# Patient Record
Sex: MALE | Race: WHITE | NOT HISPANIC OR LATINO | Employment: UNEMPLOYED | URBAN - METROPOLITAN AREA
[De-identification: names, ages, dates, MRNs, and addresses within clinical notes are randomized per-mention and may not be internally consistent; named-entity substitution may affect disease eponyms.]

---

## 2018-05-11 ENCOUNTER — OFFICE VISIT (OUTPATIENT)
Dept: URGENT CARE | Facility: CLINIC | Age: 38
End: 2018-05-11
Payer: COMMERCIAL

## 2018-05-11 VITALS
DIASTOLIC BLOOD PRESSURE: 106 MMHG | WEIGHT: 200 LBS | OXYGEN SATURATION: 99 % | TEMPERATURE: 98 F | HEART RATE: 100 BPM | BODY MASS INDEX: 27.09 KG/M2 | RESPIRATION RATE: 16 BRPM | SYSTOLIC BLOOD PRESSURE: 160 MMHG | HEIGHT: 72 IN

## 2018-05-11 DIAGNOSIS — K29.20 ACUTE ALCOHOLIC GASTRITIS WITHOUT HEMORRHAGE: Primary | ICD-10-CM

## 2018-05-11 DIAGNOSIS — R11.2 NON-INTRACTABLE VOMITING WITH NAUSEA, UNSPECIFIED VOMITING TYPE: ICD-10-CM

## 2018-05-11 PROCEDURE — 96372 THER/PROPH/DIAG INJ SC/IM: CPT | Mod: S$GLB,,, | Performed by: EMERGENCY MEDICINE

## 2018-05-11 PROCEDURE — 99203 OFFICE O/P NEW LOW 30 MIN: CPT | Mod: 25,S$GLB,, | Performed by: NURSE PRACTITIONER

## 2018-05-11 RX ORDER — ONDANSETRON 4 MG/1
4 TABLET, FILM COATED ORAL DAILY PRN
Qty: 30 TABLET | Refills: 0 | Status: SHIPPED | OUTPATIENT
Start: 2018-05-11 | End: 2018-11-15

## 2018-05-11 RX ORDER — ONDANSETRON 2 MG/ML
4 INJECTION INTRAMUSCULAR; INTRAVENOUS
Status: COMPLETED | OUTPATIENT
Start: 2018-05-11 | End: 2018-05-11

## 2018-05-11 RX ADMIN — ONDANSETRON 4 MG: 2 INJECTION INTRAMUSCULAR; INTRAVENOUS at 02:05

## 2018-05-11 NOTE — PROGRESS NOTES
Subjective:       Patient ID: Darryl Becerra is a 37 y.o. male.    Vitals:  height is 6' (1.829 m) and weight is 90.7 kg (200 lb). His temperature is 98 °F (36.7 °C). His blood pressure is 160/106 (abnormal) and his pulse is 100. His respiration is 16 and oxygen saturation is 99%.     Chief Complaint: Abdominal Pain (x 1 day )    He has had epigastric stomach pain that started this morning. He hasent been able to keep anything down all day. No blood or mucusin emesis. One bout of loose stool. Endorses chills earlier in day. He feels like he drank ETOH in excess last night but denies this as a chronic issue. Denies eating anything unusual or that would have caused stomach issue (fried chicken).    Pt without Hx of HTN, but drank in excess last night and is very uncomfortable today likely reason for current eelvation. Advised him to monitor and report to PCP at next visit.      Abdominal Pain   This is a new problem. The current episode started today. The onset quality is sudden. The problem occurs constantly. The pain is located in the epigastric region. The pain is at a severity of 7/10. The pain is moderate. The quality of the pain is cramping. The abdominal pain radiates to the epigastric region. Associated symptoms include frequency, nausea and vomiting. Pertinent negatives include no constipation, diarrhea, dysuria, fever or hematuria. The pain is aggravated by movement, vomiting and eating. The pain is relieved by nothing. He has tried antacids for the symptoms. The treatment provided no relief. There is no history of abdominal surgery, GERD or irritable bowel syndrome. Patient's medical history includes kidney stones.     Review of Systems   Constitution: Positive for chills, malaise/fatigue and night sweats. Negative for fever.   Eyes: Negative for discharge.   Skin: Negative for flushing and rash.   Musculoskeletal: Negative for back pain.   Gastrointestinal: Positive for abdominal pain, nausea and  vomiting. Negative for constipation and diarrhea.   Genitourinary: Positive for frequency. Negative for dysuria, genital sores, hematuria and urgency.   Neurological: Positive for light-headedness.       Objective:      Physical Exam   Constitutional: He is oriented to person, place, and time. He appears well-developed and well-nourished.  Non-toxic appearance. He does not have a sickly appearance. He appears ill.   Appears uncomfortable   HENT:   Head: Normocephalic and atraumatic.   Right Ear: External ear normal.   Left Ear: External ear normal.   Nose: Nose normal.   Mouth/Throat: Mucous membranes are normal.   No sublingual icterus   Eyes: Conjunctivae and lids are normal. No scleral icterus.   Neck: Trachea normal and full passive range of motion without pain. Neck supple.   Cardiovascular: Normal rate, regular rhythm, normal heart sounds and normal pulses.    Pulmonary/Chest: Effort normal and breath sounds normal. No respiratory distress. He has no wheezes. He has no rhonchi. He has no rales.   Abdominal: Soft. Normal appearance and bowel sounds are normal. He exhibits no distension, no abdominal bruit, no pulsatile midline mass and no mass. There is no hepatosplenomegaly. There is generalized tenderness. There is no rigidity, no rebound, no guarding, no tenderness at McBurney's point and negative Murillo's sign.   Anticipatory tenderness while palpating abdomen (no specific area of tenderness)   Musculoskeletal: Normal range of motion. He exhibits no edema.   Neurological: He is alert and oriented to person, place, and time. He has normal strength.   Skin: Skin is warm, dry and intact. He is not diaphoretic. No pallor.   Psychiatric: He has a normal mood and affect. His speech is normal and behavior is normal. Judgment and thought content normal. Cognition and memory are normal.   Nursing note and vitals reviewed.      Total relief of nausea after IM Zofran.    Assessment:       1. Acute alcoholic gastritis  without hemorrhage    2. Non-intractable vomiting with nausea, unspecified vomiting type        Plan:         Acute alcoholic gastritis without hemorrhage  -     ondansetron (ZOFRAN) 4 MG tablet; Take 1 tablet (4 mg total) by mouth daily as needed.  Dispense: 30 tablet; Refill: 0    Non-intractable vomiting with nausea, unspecified vomiting type  -     ondansetron injection 4 mg; Inject 4 mg into the muscle one time.  -     ondansetron (ZOFRAN) 4 MG tablet; Take 1 tablet (4 mg total) by mouth daily as needed.  Dispense: 30 tablet; Refill: 0      Patient Instructions     Gastritis (Adult)    Gastritis is inflammation and irritation of the stomach lining. It can be present for a short time (acute) or be long lasting (chronic). Gastritis is often caused by infection with bacteria called H pylori. More than a third of people in the  have this bacteria in their bodies. In many cases, H pylori causes no problems or symptoms. In some people, though, the infection irritates the stomach lining and causes gastritis. Other causes of stomach irritation include drinking alcohol or taking pain-relieving medicines called NSAIDs (such as aspirin or ibuprofen).   Symptoms of gastritis can include:  · Abdominal pain or bloating  · Loss of appetite  · Nausea or vomiting  · Vomiting blood or having black stools  · Feeling more tired than usual  An inflamed and irritated stomach lining is more likely to develop a sore called an ulcer. To help prevent this, gastritis should be treated.  Home care  If needed, medicines may be prescribed. If you have H pylori infection, treating it will likely relieve your symptoms. Other changes can help reduce stomach irritation and help it heal.  · If you have been prescribed medicines for H pylori infection, take them as directed. Take all of the medicine until it is finished or your healthcare provider tells you to stop, even if you feel better.  · Your healthcare provider may recommend avoiding  NSAIDs. If you take daily aspirin for your heart or other medical reasons, do not stop without talking to your healthcare provider first.  · Avoid drinking alcohol.  · Stop smoking. Smoking can irritate the stomach and delay healing. As much as possible, stay away from second hand smoke.  Follow-up care  Follow up with your healthcare provider, or as advised by our staff. Testing may be needed to check for inflammation or an ulcer.  When to seek medical advice  Call your healthcare provider for any of the following:  · Stomach pain that gets worse or moves to the lower right abdomen (appendix area)  · Chest pain that appears or gets worse, or spreads to the back, neck, shoulder, or arm  · Frequent vomiting (cant keep down liquids)  · Blood in the stool or vomit (red or black in color)  · Feeling weak or dizzy  · Fever of 100.4ºF (38ºC) or higher, or as directed by your healthcare provider  Date Last Reviewed: 6/22/2015  © 0622-4207 [employer] [address] [place] [address], [place] [address]. All rights reserved. This information is not intended as a substitute for professional medical care. Always follow your healthcare professional's instructions.

## 2018-05-11 NOTE — LETTER
May 11, 2018      [facility] [address]  Phone: [phone]  Fax: [phone]       Patient: Darryl Becerra   YOB: 1980  Date of Visit: 05/11/2018    To Whom It May Concern:    Lula Becerra  was at [facility] on 05/11/2018. He may return to work/school on 5/14/2018 with no restrictions. If you have any questions or concerns, or if I can be of further assistance, please do not hesitate to contact me.    Sincerely,        Joey Encarnacion, NP

## 2018-05-11 NOTE — PATIENT INSTRUCTIONS
Gastritis (Adult)    Gastritis is inflammation and irritation of the stomach lining. It can be present for a short time (acute) or be long lasting (chronic). Gastritis is often caused by infection with bacteria called H pylori. More than a third of people in the [place] have this bacteria in their bodies. In many cases, H pylori causes no problems or symptoms. In some people, though, the infection irritates the stomach lining and causes gastritis. Other causes of stomach irritation include drinking alcohol or taking pain-relieving medicines called NSAIDs (such as aspirin or ibuprofen).   Symptoms of gastritis can include:  · Abdominal pain or bloating  · Loss of appetite  · Nausea or vomiting  · Vomiting blood or having black stools  · Feeling more tired than usual  An inflamed and irritated stomach lining is more likely to develop a sore called an ulcer. To help prevent this, gastritis should be treated.  Home care  If needed, medicines may be prescribed. If you have H pylori infection, treating it will likely relieve your symptoms. Other changes can help reduce stomach irritation and help it heal.  · If you have been prescribed medicines for H pylori infection, take them as directed. Take all of the medicine until it is finished or your healthcare provider tells you to stop, even if you feel better.  · Your healthcare provider may recommend avoiding NSAIDs. If you take daily aspirin for your heart or other medical reasons, do not stop without talking to your healthcare provider first.  · Avoid drinking alcohol.  · Stop smoking. Smoking can irritate the stomach and delay healing. As much as possible, stay away from second hand smoke.  Follow-up care  Follow up with your healthcare provider, or as advised by our staff. Testing may be needed to check for inflammation or an ulcer.  When to seek medical advice  Call your healthcare provider for any of the following:  · Stomach pain that gets worse or moves to the lower  right abdomen (appendix area)  · Chest pain that appears or gets worse, or spreads to the back, neck, shoulder, or arm  · Frequent vomiting (cant keep down liquids)  · Blood in the stool or vomit (red or black in color)  · Feeling weak or dizzy  · Fever of 100.4ºF (38ºC) or higher, or as directed by your healthcare provider  Date Last Reviewed: 6/22/2015  © 8170-7731 [employer]. [address] [place] [address]. All rights reserved. This information is not intended as a substitute for professional medical care. Always follow your healthcare professional's instructions.

## 2018-09-19 ENCOUNTER — OFFICE VISIT (OUTPATIENT)
Dept: URGENT CARE | Facility: CLINIC | Age: 38
End: 2018-09-19
Payer: COMMERCIAL

## 2018-09-19 VITALS
DIASTOLIC BLOOD PRESSURE: 96 MMHG | SYSTOLIC BLOOD PRESSURE: 144 MMHG | WEIGHT: 200 LBS | HEIGHT: 72 IN | OXYGEN SATURATION: 98 % | BODY MASS INDEX: 27.09 KG/M2 | TEMPERATURE: 97 F | RESPIRATION RATE: 16 BRPM | HEART RATE: 85 BPM

## 2018-09-19 DIAGNOSIS — M10.9 ACUTE GOUT OF LEFT HAND, UNSPECIFIED CAUSE: Primary | ICD-10-CM

## 2018-09-19 DIAGNOSIS — Z76.89 ESTABLISHING CARE WITH NEW DOCTOR, ENCOUNTER FOR: ICD-10-CM

## 2018-09-19 PROCEDURE — 99214 OFFICE O/P EST MOD 30 MIN: CPT | Mod: S$GLB,,, | Performed by: NURSE PRACTITIONER

## 2018-09-19 RX ORDER — METHYLPREDNISOLONE 4 MG/1
TABLET ORAL
Qty: 1 PACKAGE | Refills: 0 | Status: SHIPPED | OUTPATIENT
Start: 2018-09-19 | End: 2021-03-04 | Stop reason: ALTCHOICE

## 2018-09-19 RX ORDER — NAPROXEN SODIUM 220 MG
220 TABLET ORAL
COMMUNITY

## 2018-09-19 RX ORDER — INDOMETHACIN 50 MG/1
50 CAPSULE ORAL 3 TIMES DAILY
Qty: 21 CAPSULE | Refills: 0 | Status: SHIPPED | OUTPATIENT
Start: 2018-09-19 | End: 2018-09-26

## 2018-09-19 NOTE — PROGRESS NOTES
Subjective:       Patient ID: Darryl Becerra is a 37 y.o. male.    Vitals:  height is 6' (1.829 m) and weight is 90.7 kg (200 lb). His temperature is 97.2 °F (36.2 °C). His blood pressure is 144/96 (abnormal) and his pulse is 85. His respiration is 16 and oxygen saturation is 98%.     Chief Complaint: Hand Pain    Patient in for left knuckle pain that started 2 days ago. Its the 3rd and fourth carpels that's are affected. Has been taking aleve for the pain. Patient has history of gout, but it was on his foot and last flare up was on his foot. Patient is from [place], but has lived here for 10 years. Patient is a . Denies any injuries or trauma to the hand. Knuckles are red and swollen. Patient has pain with moving his hand and fingers.       Hand Pain    The incident occurred 2 days ago. There was no injury mechanism. The pain radiates to the left hand. The pain is at a severity of 9/10. Associated symptoms include numbness and tingling. Pertinent negatives include no chest pain. He has tried NSAIDs for the symptoms.     Review of Systems   Constitution: Negative for chills and fever.   HENT: Negative for sore throat.    Eyes: Negative for blurred vision.   Cardiovascular: Negative for chest pain.   Respiratory: Negative for shortness of breath.    Skin: Negative for rash.   Musculoskeletal: Positive for joint pain and joint swelling. Negative for back pain.   Gastrointestinal: Negative for abdominal pain, diarrhea, nausea and vomiting.   Neurological: Positive for numbness and tingling. Negative for headaches.   Psychiatric/Behavioral: The patient is not nervous/anxious.        Objective:      Physical Exam   Constitutional: He is oriented to person, place, and time. Vital signs are normal. He appears well-developed and well-nourished. He is active and cooperative. No distress.   HENT:   Head: Normocephalic and atraumatic.   Nose: Nose normal.   Mouth/Throat: Oropharynx is clear and moist and mucous  membranes are normal.   Eyes: Conjunctivae and lids are normal.   Neck: Trachea normal, normal range of motion, full passive range of motion without pain and phonation normal. Neck supple.   Cardiovascular: Normal rate, regular rhythm, normal heart sounds, intact distal pulses and normal pulses.   Pulses:       Radial pulses are 2+ on the right side, and 2+ on the left side.   Pulmonary/Chest: Effort normal and breath sounds normal.   Abdominal: Soft. Normal appearance and bowel sounds are normal. He exhibits no abdominal bruit, no pulsatile midline mass and no mass.   Musculoskeletal: He exhibits no edema or deformity.        Left hand: He exhibits decreased range of motion, tenderness (3rd and 4th knuckle swelling and erythema. TTP. mildly warm to touch. no abrasions or open wounds present. ), bony tenderness and swelling. He exhibits normal two-point discrimination, normal capillary refill, no deformity and no laceration. Decreased sensation noted. Decreased sensation is not present in the ulnar distribution, is not present in the medial redistribution and is not present in the radial distribution. Normal strength noted. He exhibits no finger abduction, no thumb/finger opposition and no wrist extension trouble.        Hands:  Neurological: He is alert and oriented to person, place, and time. He has normal strength and normal reflexes. No sensory deficit.   Skin: Skin is warm, dry and intact. He is not diaphoretic.   Psychiatric: He has a normal mood and affect. His speech is normal and behavior is normal. Judgment and thought content normal. Cognition and memory are normal.   Nursing note and vitals reviewed.      Assessment:       1. Acute gout of left hand, unspecified cause    2. Establishing care with new doctor, encounter for        Plan:       MDM:  Patient previously has had gout has had flare for the last few years.  He is taking colchicine and Indocin in the past with relief of symptoms.  He does not  have a primary care doctor.  Given a referral to establish care with a new PCP.  He is educated on gout treatment and avoidance of certain foods.  He verbalizes understanding and is in agreement with plan of care.    Acute gout of left hand, unspecified cause  -     indomethacin (INDOCIN) 50 MG capsule; Take 1 capsule (50 mg total) by mouth 3 (three) times daily. for 7 days  Dispense: 21 capsule; Refill: 0  -     methylPREDNISolone (MEDROL DOSEPACK) 4 mg tablet; use as directed  Dispense: 1 Package; Refill: 0    Establishing care with new doctor, encounter for  -     Ambulatory referral to Internal Medicine      Patient Instructions       Gout    Gout is an inflammation of a joint due to a build-up of gout crystals in the joint fluid. This occurs when there is an excess of uric acid (a normal waste product) in the body. Uric acid builds up in the body when the kidneys are unable to filter enough of it from the blood. This may occur with age. It is also associated with kidney disease. Gout occurs more often in people with obesity, diabetes, high blood pressure, or high levels of fats in the blood. It may run in families. Gout tends to come and go. A flare up of gout is called an attack. Drinking alcohol or eating certain foods (such as shellfish or foods with additives such as high-fructose corn syrup) may increase uric acid levels in the blood and cause a gout attack.  During a gout attack, the affected joint may become a hot, red, swollen and painful. If you have had one attack of gout, you are likely to have another. An attack of gout can be treated with medicine. If these attacks become frequent, a daily medicine may be prescribed to help the kidneys remove uric acid from the body.  Home care  During a gout attack:  · Rest painful joints. If gout affects the joints of your foot or leg, you may want to use crutches for the first few days to keep from bearing weight on the affected joint.  · When sitting or lying  down, raise the painful joint to a level higher than your heart.  · Apply an ice pack (ice cubes in a plastic bag wrapped in a thin towel) over the injured area for 20 minutes every 1 to 2 hours the first day for pain relief. Continue this 3 to 4 times a day for swelling and pain.  · Avoid alcohol and foods listed below (see Preventing attacks) during a gout attack. Drink extra fluid to help flush the uric acid through your kidneys.  · If you were prescribed a medicine to treat gout, take it as your healthcare provider has instructed. Don't skip doses.  · Take anti-inflammatory medicine as directed.   · If pain medicines have been prescribed, take them exactly as directed.    Preventing attacks  · Minimize or avoid alcohol use. Excess alcohol intake can cause a gout attack.  · Limit these foods and beverages:  ¨ Organ meats, such as kidneys and liver  ¨ Certain seafoods (anchovies, sardines, shrimp, scallops, herring, mackerel)  ¨ Wild game, meat extracts and meat gravies  ¨ Foods and beverages sweetened with high-fructose corn syrup, such as sodas  · Eat a healthy diet including low-fat and nonfat dairy, whole grains, and vegetables.  · If you are overweight, talk to your healthcare provider about a weight reduction plan. Avoid fasting or extreme low calorie diets (less than 900 calories per day). This will increase uric acid levels in the body.  · If you have diabetes or high blood pressure, work with your doctor to manage these conditions.  · Protect the joint from injury. Trauma can trigger a gout attack.  Follow-up care  Follow up with your healthcare provider, or as advised.   When to seek medical advice  Call your healthcare provider if you have any of the following:  · Fever over 100.4°F (38.ºC) with worsening joint pain  · Increasing redness around the joint  · Pain developing in another joint  · Repeated vomiting, abdominal pain, or blood in the vomit or stool (black or red color)  Date Last Reviewed:  3/1/2017  © 6617-9166 [employer]. [address] [place] [address], [place] [address]. All rights reserved. This information is not intended as a substitute for professional medical care. Always follow your healthcare professional's instructions.        Treating Gout Attacks     Raising the joint above the level of your heart can help reduce gout symptoms.     Gout is a disease that affects the joints. It is caused by excess uric acid in your blood stream that may lead to crystals forming in your joints. Left untreated, it can lead to painful foot and joint deformities and even kidney problems. But, by treating gout early, you can relieve pain and help prevent future problems. Gout can usually be treated with medication and proper diet. In severe cases, surgery may be needed.  Gout attacks are painful and often happen more than once. Taking medications may reduce pain and prevent attacks in the future. There are also some things you can do at home to relieve symptoms.  Medications for gout  Your healthcare provider may prescribe a daily medication to reduce levels of uric acid. Reducing your uric acid levels may help prevent gout attacks. Allopurinol is one commonly used medication taken daily to reduce uric acid levels. Other medications can help relieve pain and swelling during an acute attack. Medicines such as NSAIDs (nonsteroidal anti-inflammatory medicines), steroids, and colchicine may be prescribed for intermittent use to relieve an acute gout attack. Be sure to take your medication as directed.  What you can do  Below are some things you can do at home to relieve gout symptoms. Your healthcare provider may have other tips.  · Rest the painful joint as much as you can.  · Raise the painful joint so it is at a level higher than your heart.  · Use ice for 10 minutes every 1-2 hours as possible.  How can I prevent gout?  With a little effort, you may be able to prevent gout attacks in the future. Here are  some things you can do:  · Avoid foods high in purines  ¨ Certain meats (red meat, processed meat, turkey)  ¨ Organ meats (kidney, liver, sweetbread)  ¨ Shellfish (lobster, crab, shrimp, scallop, mussel)  ¨ Certain fish (anchovy, sardine, herring, mackerel)  · Take any medications prescribed by your healthcare provider.  · Lose weight if you need to.  · Reduce high fructose corn syrup in meals and drinks.  · Reduce or eliminate consumption of alcohol, particularly beer, but also red wine and spirits.  · Control blood pressure, diabetes, and cholesterol.  · Drink plenty of water to help flush uric acid from your body.  Date Last Reviewed: 2/1/2016 © 2000-2017 [employer]. [address] [place] [address], [place] [address]. All rights reserved. This information is not intended as a substitute for professional medical care. Always follow your healthcare professional's instructions.        Gout Diet  Gout is a painful condition caused by an excess of uric acid, a waste product made by the body. Uric acid forms crystals that collect in the joints. The immune response to these crystals brings on symptoms of joint pain and swelling. This is called a gout attack. Often, medications and diet changes are combined to manage gout. Below are some guidelines for changing your diet to help you manage gout and prevent attacks. Your health care provider will help you determine the best eating plan for you.     Eating to manage gout  Weight loss for those who are overweight may help reduce gout attacks.  Eat less of these foods  Eating too many foods containing purines may raise the levels of uric acid in your body. This raises your risk for a gout attack. Try to limit these foods and drinks:  · Alcohol, such as beer and red wine. You may be told to avoid alcohol completely.  · Soft drinks that contain sugar or high fructose corn syrup  · Certain fish, including anchovies, sardines, fish eggs, and  herring  · Shellfish  · Certain meats, such as red meat, hot dogs, luncheon meats, and turkey  · Organ meats, such as liver, kidneys, and sweetbreads  · Legumes, such as dried beans and peas  · Other high fat foods such as gravy, whole milk, and high fat cheeses  · Vegetables such as asparagus, cauliflower, spinach, and mushrooms used to be thought to contribute to an increased risk for a gout attack, but recent studies show that high purine vegetables don't increase the risk for a gout attack.  Eat more of these foods  Other foods may be helpful for people with gout. Add some of these foods to your diet:  · Cherries contain chemicals that may lower uric acid.  · Omega fatty acids. These are found in some fatty fish such as salmon, certain oils (flax, olive, or nut), and nuts themselves. Omega fatty acids may help prevent inflammation due to gout.  · Dairy products that are low-fat or fat-free, such as cheese and yogurt  · Complex carbohydrate foods, including whole grains, brown rice, oats, and beans  · Coffee, in moderation  · Water, approximately 64 ounces per day  Follow-up care  Follow up with your healthcare provider as advised.  When to seek medical advice  Call your healthcare provider right away if any of these occur:  · Return of gout symptoms, usually at night:  · Severe pain, swelling, and heat in a joint, especially the base of the big toe  · Affected joint is hard to move  · Skin of the affected joint is purple or red  · Fever of 100.4°F (38°C) or higher  · Pain that doesn't get better even with prescribed medicine   Date Last Reviewed: 1/12/2016  © 7869-9314 [employer]. [address] [place] [address]. All rights reserved. This information is not intended as a substitute for professional medical care. Always follow your healthcare professional's instructions.      -Indocin 3 times daily for the next 7 days.  -Medrol(steroid) dose pack.  -Referral to establish care with a  primary care doctor.  -If symptoms worsen go to the ER.  -Follow up and establish care with a new primary care doctor.      Please follow up with your Primary care provider within 2-5 days if your signs and symptoms have not resolved or worsen.     If your condition worsens or fails to improve we recommend that you receive another evaluation at the emergency room immediately or contact your primary medical clinic to discuss your concerns.   You must understand that you have received an Urgent Care treatment only and that you may be released before all of your medical problems are known or treated. You, the patient, will arrange for follow up care as instructed.

## 2018-09-19 NOTE — PATIENT INSTRUCTIONS
Gout    Gout is an inflammation of a joint due to a build-up of gout crystals in the joint fluid. This occurs when there is an excess of uric acid (a normal waste product) in the body. Uric acid builds up in the body when the kidneys are unable to filter enough of it from the blood. This may occur with age. It is also associated with kidney disease. Gout occurs more often in people with obesity, diabetes, high blood pressure, or high levels of fats in the blood. It may run in families. Gout tends to come and go. A flare up of gout is called an attack. Drinking alcohol or eating certain foods (such as shellfish or foods with additives such as high-fructose corn syrup) may increase uric acid levels in the blood and cause a gout attack.  During a gout attack, the affected joint may become a hot, red, swollen and painful. If you have had one attack of gout, you are likely to have another. An attack of gout can be treated with medicine. If these attacks become frequent, a daily medicine may be prescribed to help the kidneys remove uric acid from the body.  Home care  During a gout attack:  · Rest painful joints. If gout affects the joints of your foot or leg, you may want to use crutches for the first few days to keep from bearing weight on the affected joint.  · When sitting or lying down, raise the painful joint to a level higher than your heart.  · Apply an ice pack (ice cubes in a plastic bag wrapped in a thin towel) over the injured area for 20 minutes every 1 to 2 hours the first day for pain relief. Continue this 3 to 4 times a day for swelling and pain.  · Avoid alcohol and foods listed below (see Preventing attacks) during a gout attack. Drink extra fluid to help flush the uric acid through your kidneys.  · If you were prescribed a medicine to treat gout, take it as your healthcare provider has instructed. Don't skip doses.  · Take anti-inflammatory medicine as directed.   · If pain medicines have been  prescribed, take them exactly as directed.    Preventing attacks  · Minimize or avoid alcohol use. Excess alcohol intake can cause a gout attack.  · Limit these foods and beverages:  ¨ Organ meats, such as kidneys and liver  ¨ Certain seafoods (anchovies, sardines, shrimp, scallops, herring, mackerel)  ¨ Wild game, meat extracts and meat gravies  ¨ Foods and beverages sweetened with high-fructose corn syrup, such as sodas  · Eat a healthy diet including low-fat and nonfat dairy, whole grains, and vegetables.  · If you are overweight, talk to your healthcare provider about a weight reduction plan. Avoid fasting or extreme low calorie diets (less than 900 calories per day). This will increase uric acid levels in the body.  · If you have diabetes or high blood pressure, work with your doctor to manage these conditions.  · Protect the joint from injury. Trauma can trigger a gout attack.  Follow-up care  Follow up with your healthcare provider, or as advised.   When to seek medical advice  Call your healthcare provider if you have any of the following:  · Fever over 100.4°F (38.ºC) with worsening joint pain  · Increasing redness around the joint  · Pain developing in another joint  · Repeated vomiting, abdominal pain, or blood in the vomit or stool (black or red color)  Date Last Reviewed: 3/1/2017  © 1815-9239 [employer]. [address] [place] [address], [place] [address]. All rights reserved. This information is not intended as a substitute for professional medical care. Always follow your healthcare professional's instructions.        Treating Gout Attacks     Raising the joint above the level of your heart can help reduce gout symptoms.     Gout is a disease that affects the joints. It is caused by excess uric acid in your blood stream that may lead to crystals forming in your joints. Left untreated, it can lead to painful foot and joint deformities and even kidney problems. But, by treating gout early, you can  relieve pain and help prevent future problems. Gout can usually be treated with medication and proper diet. In severe cases, surgery may be needed.  Gout attacks are painful and often happen more than once. Taking medications may reduce pain and prevent attacks in the future. There are also some things you can do at home to relieve symptoms.  Medications for gout  Your healthcare provider may prescribe a daily medication to reduce levels of uric acid. Reducing your uric acid levels may help prevent gout attacks. Allopurinol is one commonly used medication taken daily to reduce uric acid levels. Other medications can help relieve pain and swelling during an acute attack. Medicines such as NSAIDs (nonsteroidal anti-inflammatory medicines), steroids, and colchicine may be prescribed for intermittent use to relieve an acute gout attack. Be sure to take your medication as directed.  What you can do  Below are some things you can do at home to relieve gout symptoms. Your healthcare provider may have other tips.  · Rest the painful joint as much as you can.  · Raise the painful joint so it is at a level higher than your heart.  · Use ice for 10 minutes every 1-2 hours as possible.  How can I prevent gout?  With a little effort, you may be able to prevent gout attacks in the future. Here are some things you can do:  · Avoid foods high in purines  ¨ Certain meats (red meat, processed meat, turkey)  ¨ Organ meats (kidney, liver, sweetbread)  ¨ Shellfish (lobster, crab, shrimp, scallop, mussel)  ¨ Certain fish (anchovy, sardine, herring, mackerel)  · Take any medications prescribed by your healthcare provider.  · Lose weight if you need to.  · Reduce high fructose corn syrup in meals and drinks.  · Reduce or eliminate consumption of alcohol, particularly beer, but also red wine and spirits.  · Control blood pressure, diabetes, and cholesterol.  · Drink plenty of water to help flush uric acid from your body.  Date Last  Reviewed: 2/1/2016  © 6447-4144 [employer]. [address] [place] [address], [place] [address]. All rights reserved. This information is not intended as a substitute for professional medical care. Always follow your healthcare professional's instructions.        Gout Diet  Gout is a painful condition caused by an excess of uric acid, a waste product made by the body. Uric acid forms crystals that collect in the joints. The immune response to these crystals brings on symptoms of joint pain and swelling. This is called a gout attack. Often, medications and diet changes are combined to manage gout. Below are some guidelines for changing your diet to help you manage gout and prevent attacks. Your health care provider will help you determine the best eating plan for you.     Eating to manage gout  Weight loss for those who are overweight may help reduce gout attacks.  Eat less of these foods  Eating too many foods containing purines may raise the levels of uric acid in your body. This raises your risk for a gout attack. Try to limit these foods and drinks:  · Alcohol, such as beer and red wine. You may be told to avoid alcohol completely.  · Soft drinks that contain sugar or high fructose corn syrup  · Certain fish, including anchovies, sardines, fish eggs, and herring  · Shellfish  · Certain meats, such as red meat, hot dogs, luncheon meats, and turkey  · Organ meats, such as liver, kidneys, and sweetbreads  · Legumes, such as dried beans and peas  · Other high fat foods such as gravy, whole milk, and high fat cheeses  · Vegetables such as asparagus, cauliflower, spinach, and mushrooms used to be thought to contribute to an increased risk for a gout attack, but recent studies show that high purine vegetables don't increase the risk for a gout attack.  Eat more of these foods  Other foods may be helpful for people with gout. Add some of these foods to your diet:  · Cherries contain chemicals that may lower uric  acid.  · Omega fatty acids. These are found in some fatty fish such as salmon, certain oils (flax, olive, or nut), and nuts themselves. Omega fatty acids may help prevent inflammation due to gout.  · Dairy products that are low-fat or fat-free, such as cheese and yogurt  · Complex carbohydrate foods, including whole grains, brown rice, oats, and beans  · Coffee, in moderation  · Water, approximately 64 ounces per day  Follow-up care  Follow up with your healthcare provider as advised.  When to seek medical advice  Call your healthcare provider right away if any of these occur:  · Return of gout symptoms, usually at night:  · Severe pain, swelling, and heat in a joint, especially the base of the big toe  · Affected joint is hard to move  · Skin of the affected joint is purple or red  · Fever of 100.4°F (38°C) or higher  · Pain that doesn't get better even with prescribed medicine   Date Last Reviewed: 1/12/2016  © 8742-5724 [employer]. [address] [place] [address]. All rights reserved. This information is not intended as a substitute for professional medical care. Always follow your healthcare professional's instructions.      -Indocin 3 times daily for the next 7 days.  -Medrol(steroid) dose pack.  -Referral to establish care with a primary care doctor.  -If symptoms worsen go to the ER.  -Follow up and establish care with a new primary care doctor.      Please follow up with your Primary care provider within 2-5 days if your signs and symptoms have not resolved or worsen.     If your condition worsens or fails to improve we recommend that you receive another evaluation at the emergency room immediately or contact your primary medical clinic to discuss your concerns.   You must understand that you have received an Urgent Care treatment only and that you may be released before all of your medical problems are known or treated. You, the patient, will arrange for follow up care as  instructed.

## 2018-09-19 NOTE — LETTER
September 19, 2018      [facility] [address]  Phone: [phone]  Fax: [phone]       Patient: Darryl Becerra   YOB: 1980  Date of Visit: 09/19/2018    To Whom It May Concern:    Lula Becerra  was at [facility] on 09/19/2018. He may return to work/school on 09/21/18 with no restrictions. If you have any questions or concerns, or if I can be of further assistance, please do not hesitate to contact me.    Sincerely,        May Cunha NP

## 2018-09-22 ENCOUNTER — TELEPHONE (OUTPATIENT)
Dept: URGENT CARE | Facility: CLINIC | Age: 38
End: 2018-09-22

## 2018-11-15 ENCOUNTER — OFFICE VISIT (OUTPATIENT)
Dept: URGENT CARE | Facility: CLINIC | Age: 38
End: 2018-11-15
Payer: COMMERCIAL

## 2018-11-15 VITALS
SYSTOLIC BLOOD PRESSURE: 167 MMHG | HEIGHT: 72 IN | OXYGEN SATURATION: 99 % | WEIGHT: 200 LBS | BODY MASS INDEX: 27.09 KG/M2 | RESPIRATION RATE: 20 BRPM | TEMPERATURE: 97 F | HEART RATE: 113 BPM | DIASTOLIC BLOOD PRESSURE: 110 MMHG

## 2018-11-15 DIAGNOSIS — I10 ESSENTIAL HYPERTENSION: ICD-10-CM

## 2018-11-15 DIAGNOSIS — F17.200 SMOKING ADDICTION: ICD-10-CM

## 2018-11-15 DIAGNOSIS — F10.10 ALCOHOL ABUSE: Primary | ICD-10-CM

## 2018-11-15 DIAGNOSIS — Z87.898 HISTORY OF SEIZURE: ICD-10-CM

## 2018-11-15 PROCEDURE — 99214 OFFICE O/P EST MOD 30 MIN: CPT | Mod: S$GLB,,, | Performed by: NURSE PRACTITIONER

## 2018-11-15 PROCEDURE — 3008F BODY MASS INDEX DOCD: CPT | Mod: CPTII,S$GLB,, | Performed by: NURSE PRACTITIONER

## 2018-11-15 RX ORDER — LISINOPRIL 10 MG/1
10 TABLET ORAL DAILY
COMMUNITY
End: 2021-06-09 | Stop reason: ALTCHOICE

## 2018-11-15 NOTE — PATIENT INSTRUCTIONS
"  Continue to take your Lisinopril daily as prescribed.     Keep your up coming follow up with your Primary Provider.    If you have any of the Red Flag symptoms as we discussed (as listed below), please go immediately to the Emergency Department.    Alcohol Abuse  Alcoholic drinks are harmful when you have too many of them. There is no set number of drinks that defines too much. Drinking that disrupts your life or your health is called alcohol abuse. Alcohol abuse can hurt your relationships with others. You may lose friends, a spouse, or even your job. You may be abusing alcohol if any of the following are true for you:  · Duties at home or with  suffer because of drinking.  · Duties at work or in school suffer because of drinking.  · You have missed work or school because of drinking.  · You use alcohol while driving or operating machinery.  · You have legal problems such as arrests due to drinking.  · You keep drinking even though it causes serious problems in your life.  Health effects  Alcohol abuse causes health problems. Sometimes this can happen after only drinking a little." There is no set number of drinks or amount of alcohol that defines too much. The more you drink at one time, and the more often you drink determine both the short-term and long-term health effects. It affects all parts of your body and your health, including your:  · Brain. Alcohol is a central nervous system depressant. It can damage parts of the brain that affect your balance, memory, thinking, and emotions. It can cause memory loss, blackouts, depression, agitation, sleep cycle changes, and seizures. These changes may or may not be reversible.  · Heart and vascular system. Alcohol affects multiple areas. It can damage heart muscle causing cardiomyopathy, which is a weakening and stretching of the heart muscle. This can lead to trouble breathing, an irregular heartbeat, atrial fibrillation, leg swelling, and heart failure. " Alcohol use makes the blood vessels stiffen causing high blood pressure. All of these problems increase your risk of having heart attacks or strokes.  · Liver. Alcohol causes fat to build up in the liver, affecting its normal function. This increases the risk for hepatitis, leading to abdominal pain, appetite loss, jaundice, bleeding problems, liver fibrosis, and cirrhosis. This, in turn, can affect your ability to fight off infections, and can cause diabetes. The liver changes prevent it from removing toxins in your blood that can cause encephalopathy, which may show with confusion, altered level of consciousness, personality changes, memory loss, seizures, coma, and death.  · Pancreas. Alcohol can cause inflammation of the pancreas, or pancreatitis. This can cause abdominal pain, fever, and diabetes.  · Immune system. Alcohol weakens your immune system in a number of ways. It suppresses your immune system making it harder to fight infections and colds. It also increases the chance of getting pneumonia and tuberculosis.  · Cancer. Alcohol is a risk factor for developing cancer of the mouth, esophagus, pharynx, larynx, liver, and breast.  · Sexual function. Alcohol can lead to sexual problems.  Home care  The following guidelines will help you deal with alcohol abuse:  · Admit you have a problem with alcohol.  · Ask for help from your healthcare provider and trusted family members or close friends.  · Get help from people trained in dealing with alcohol abuse. This may be individual counseling or group therapy, or it may be a supervised alcohol treatment program.  · Join a self-help group for alcohol abuse such as Alcoholics Anonymous (AA).  · Avoid people who abuse alcohol or tempt you to drink.  Follow-up care  Follow up as advised by the healthcare provider, or as advised. Contact these groups to get help:  · Alcoholics Anonymous (AA): Go to www.aa.org or check the phone book for meetings near you.  · National  Alcohol and Substance Abuse Information Center ([facility]): 659-322-1309 www.addictionHelpMeNow.Glazeon  · National [place] on Alcoholism and Drug Dependence (NCADD): 052-UAF-UIWI (033-8213) www.ncadd.org  Call 911  Call 911 if any of these occur:  · Trouble breathing or slow irregular breathing  · Chest pain  · Sudden weakness on one side of your body or sudden trouble speaking  · Heavy bleeding or vomiting blood  · Very drowsy or trouble awakening  · Fainting or loss of consciousness  · Rapid heart rate  · Seizure  When to seek medical care  Call your healthcare provider right away if any of these occur:   · Confusion  · Hallucinations (seeing, hearing, or feeling things that arent there)  · Pain in your upper abdomen that gets worse  · Repeated vomiting or black or tarry stools  · Severe shakiness  Date Last Reviewed: 6/1/2016  © 4381-1569 [employer]. [address] [place] [address]. All rights reserved. This information is not intended as a substitute for professional medical care. Always follow your healthcare professional's instructions.      Established High Blood Pressure    High blood pressure (hypertension) is a chronic disease. Often, healthcare providers dont know what causes it. But it can be caused by certain health conditions and medicines.  If you have high blood pressure, you may not have any symptoms. If you do have symptoms, they may include headache, dizziness, changes in your vision, chest pain, and shortness of breath. But even without symptoms, high blood pressure thats not treated raises your risk for heart attack and stroke. High blood pressure is a serious health risk and shouldnt be ignored.  A blood pressure reading is made up of two numbers: a higher number over a lower number. The top number is the systolic pressure. The bottom number is the diastolic pressure. A normal blood pressure is a systolic pressure of  less than 120 over a diastolic pressure of less than  80. You will see your blood pressure readings written together. For example, a person with a systolic pressure of 188 and a diastolic pressure of 78 will have 118/78 written in the medical record.  High blood pressure is when either the top number is 140 or higher, or the bottom number is 90 or higher. This must be the result when taking your blood pressure a number of times. The blood pressures between normal and high are called prehypertension.  Home care  If you have high blood pressure, you should do what is listed below to lower your blood pressure. If you are taking medicines for high blood pressure, these methods may reduce or end your need for medicines in the future.  · Begin a weight-loss program if you are overweight.  · Cut back on how much salt you get in your diet. Heres how to do this:  ¨ Dont eat foods that have a lot of salt. These include olives, pickles, smoked meats, and salted potato chips.  ¨ Dont add salt to your food at the table.  ¨ Use only small amounts of salt when cooking.  · Start an exercise program. Talk with your healthcare provider about the type of exercise program that would be best for you. It doesn't have to be hard. Even brisk walking for 20 minutes 3 times a week is a good form of exercise.  · Dont take medicines that stimulate the heart. This includes many over-the-counter cold and sinus decongestant pills and sprays, as well as diet pills. Check the warnings about hypertension on the label. Before buying any over-the-counter medicines or supplements, always ask the pharmacist about the product's potential interaction with your high blood pressure and your high blood pressure medicines.  · Stimulants such as amphetamine or cocaine could be deadly for someone with high blood pressure. Never take these.  · Limit how much caffeine you get in your diet. Switch to caffeine-free products.  · Stop smoking. If you are a long-time smoker, this can be hard. Talk to your healthcare  provider about medicines and nicotine replacement options to help you. Also, enroll in a stop-smoking program to make it more likely that you will quit for good.  · Learn how to handle stress. This is an important part of any program to lower blood pressure. Learn about relaxation methods like meditation, yoga, or biofeedback.  · If your provider prescribed medicines, take them exactly as directed. Missing doses may cause your blood pressure get out of control.  · If you miss a dose or doses, check with your healthcare provider or pharmacist about what to do.  · Consider buying an automatic blood pressure machine. Ask your provider for a recommendation. You can get one of these at most pharmacies.     The American Heart Association recommends the following guidelines for home blood pressure monitoring:  · Don't smoke or drink coffee for 30 minutes before taking your blood pressure.  · Go to the bathroom before the test.  · Relax for 5 minutes before taking the measurement.  · Sit with your back supported (don't sit on a couch or soft chair); keep your feet on the floor uncrossed. Place your arm on a solid flat surface (like a table) with the upper part of the arm at heart level. Place the middle of the cuff directly above the eye of the elbow. Check the monitor's instruction manual for an illustration.  · Take multiple readings. When you measure, take 2 to 3 readings one minute apart and record all of the results.  · Take your blood pressure at the same time every day, or as your healthcare provider recommends.  · Record the date, time, and blood pressure reading.  · Take the record with you to your next medical appointment. If your blood pressure monitor has a built-in memory, simply take the monitor with you to your next appointment.  · Call your provider if you have several high readings. Don't be frightened by a single high blood pressure reading, but if you get several high readings, check in with your  healthcare provider.  · Note: When blood pressure reaches a systolic (top number) of 180 or higher OR diastolic (bottom number) of 110 or higher, seek emergency medical treatment.  Follow-up care  You will need to see your healthcare provider regularly. This is to check your blood pressure and to make changes to your medicines. Make a follow-up appointment as directed. Bring the record of your home blood pressure readings to the appointment.  When to seek medical advice  Call your healthcare provider right away if any of these occur:  · Blood pressure reaches a systolic (upper number) of 180 or higher OR a diastolic (bottom number) of 110 or higher  · Chest pain or shortness of breath  · Severe headache  · Throbbing or rushing sound in the ears  · Nosebleed  · Sudden severe pain in your belly (abdomen)  · Extreme drowsiness, confusion, or fainting  · Dizziness or spinning sensation (vertigo)  · Weakness of an arm or leg or one side of the face  · You have problems speaking or seeing   Date Last Reviewed: 12/1/2016  © 0484-5680 [employer]. [address] [place] [address], [place] [address]. All rights reserved. This information is not intended as a substitute for professional medical care. Always follow your healthcare professional's instructions.

## 2018-11-18 ENCOUNTER — TELEPHONE (OUTPATIENT)
Dept: URGENT CARE | Facility: CLINIC | Age: 38
End: 2018-11-18

## 2019-01-12 ENCOUNTER — OFFICE VISIT (OUTPATIENT)
Dept: URGENT CARE | Facility: CLINIC | Age: 39
End: 2019-01-12
Payer: COMMERCIAL

## 2019-01-12 VITALS
RESPIRATION RATE: 17 BRPM | TEMPERATURE: 99 F | HEART RATE: 99 BPM | OXYGEN SATURATION: 98 % | DIASTOLIC BLOOD PRESSURE: 88 MMHG | HEIGHT: 72 IN | WEIGHT: 200 LBS | SYSTOLIC BLOOD PRESSURE: 140 MMHG | BODY MASS INDEX: 27.09 KG/M2

## 2019-01-12 DIAGNOSIS — J01.90 ACUTE NON-RECURRENT SINUSITIS, UNSPECIFIED LOCATION: ICD-10-CM

## 2019-01-12 DIAGNOSIS — R05.9 COUGH: Primary | ICD-10-CM

## 2019-01-12 LAB
CTP QC/QA: YES
FLUAV AG NPH QL: NEGATIVE
FLUBV AG NPH QL: NEGATIVE

## 2019-01-12 PROCEDURE — 99214 OFFICE O/P EST MOD 30 MIN: CPT | Mod: 25,S$GLB,, | Performed by: NURSE PRACTITIONER

## 2019-01-12 PROCEDURE — 87804 POCT INFLUENZA A/B: ICD-10-PCS | Mod: 59,QW,S$GLB, | Performed by: NURSE PRACTITIONER

## 2019-01-12 PROCEDURE — 96372 PR INJECTION,THERAP/PROPH/DIAG2ST, IM OR SUBCUT: ICD-10-PCS | Mod: S$GLB,,, | Performed by: NURSE PRACTITIONER

## 2019-01-12 PROCEDURE — 3008F BODY MASS INDEX DOCD: CPT | Mod: CPTII,S$GLB,, | Performed by: NURSE PRACTITIONER

## 2019-01-12 PROCEDURE — 3008F PR BODY MASS INDEX (BMI) DOCUMENTED: ICD-10-PCS | Mod: CPTII,S$GLB,, | Performed by: NURSE PRACTITIONER

## 2019-01-12 PROCEDURE — 87804 INFLUENZA ASSAY W/OPTIC: CPT | Mod: QW,S$GLB,, | Performed by: NURSE PRACTITIONER

## 2019-01-12 PROCEDURE — 3077F PR MOST RECENT SYSTOLIC BLOOD PRESSURE >= 140 MM HG: ICD-10-PCS | Mod: CPTII,S$GLB,, | Performed by: NURSE PRACTITIONER

## 2019-01-12 PROCEDURE — 3079F PR MOST RECENT DIASTOLIC BLOOD PRESSURE 80-89 MM HG: ICD-10-PCS | Mod: CPTII,S$GLB,, | Performed by: NURSE PRACTITIONER

## 2019-01-12 PROCEDURE — 3077F SYST BP >= 140 MM HG: CPT | Mod: CPTII,S$GLB,, | Performed by: NURSE PRACTITIONER

## 2019-01-12 PROCEDURE — 99214 PR OFFICE/OUTPT VISIT, EST, LEVL IV, 30-39 MIN: ICD-10-PCS | Mod: 25,S$GLB,, | Performed by: NURSE PRACTITIONER

## 2019-01-12 PROCEDURE — 3079F DIAST BP 80-89 MM HG: CPT | Mod: CPTII,S$GLB,, | Performed by: NURSE PRACTITIONER

## 2019-01-12 PROCEDURE — 96372 THER/PROPH/DIAG INJ SC/IM: CPT | Mod: S$GLB,,, | Performed by: NURSE PRACTITIONER

## 2019-01-12 RX ORDER — PROMETHAZINE HYDROCHLORIDE AND DEXTROMETHORPHAN HYDROBROMIDE 6.25; 15 MG/5ML; MG/5ML
5 SYRUP ORAL
Qty: 118 ML | Refills: 0 | Status: SHIPPED | OUTPATIENT
Start: 2019-01-12 | End: 2019-01-22

## 2019-01-12 RX ORDER — CEFTRIAXONE 500 MG/1
500 INJECTION, POWDER, FOR SOLUTION INTRAMUSCULAR; INTRAVENOUS ONCE
Status: COMPLETED | OUTPATIENT
Start: 2019-01-12 | End: 2019-01-12

## 2019-01-12 RX ORDER — BETAMETHASONE SODIUM PHOSPHATE AND BETAMETHASONE ACETATE 3; 3 MG/ML; MG/ML
6 INJECTION, SUSPENSION INTRA-ARTICULAR; INTRALESIONAL; INTRAMUSCULAR; SOFT TISSUE ONCE
Status: COMPLETED | OUTPATIENT
Start: 2019-01-12 | End: 2019-01-12

## 2019-01-12 RX ORDER — BENZONATATE 200 MG/1
200 CAPSULE ORAL 3 TIMES DAILY PRN
Qty: 20 CAPSULE | Refills: 0 | Status: SHIPPED | OUTPATIENT
Start: 2019-01-12 | End: 2019-01-22

## 2019-01-12 RX ORDER — CETIRIZINE HYDROCHLORIDE 10 MG/1
10 TABLET ORAL DAILY
Qty: 30 TABLET | Refills: 2 | Status: SHIPPED | OUTPATIENT
Start: 2019-01-12 | End: 2021-03-04

## 2019-01-12 RX ORDER — FLUTICASONE PROPIONATE 50 MCG
2 SPRAY, SUSPENSION (ML) NASAL 2 TIMES DAILY
Qty: 1 BOTTLE | Refills: 0 | Status: SHIPPED | OUTPATIENT
Start: 2019-01-12 | End: 2021-03-04 | Stop reason: ALTCHOICE

## 2019-01-12 RX ORDER — AZITHROMYCIN 250 MG/1
TABLET, FILM COATED ORAL
Qty: 6 TABLET | Refills: 0 | Status: SHIPPED | OUTPATIENT
Start: 2019-01-12 | End: 2021-03-04 | Stop reason: ALTCHOICE

## 2019-01-12 RX ADMIN — CEFTRIAXONE 500 MG: 500 INJECTION, POWDER, FOR SOLUTION INTRAMUSCULAR; INTRAVENOUS at 03:01

## 2019-01-12 RX ADMIN — BETAMETHASONE SODIUM PHOSPHATE AND BETAMETHASONE ACETATE 6 MG: 3; 3 INJECTION, SUSPENSION INTRA-ARTICULAR; INTRALESIONAL; INTRAMUSCULAR; SOFT TISSUE at 03:01

## 2019-01-12 NOTE — PROGRESS NOTES
Subjective:       Patient ID: Darryl Becerra is a 38 y.o. male.    Vitals:  height is 6' (1.829 m) and weight is 90.7 kg (200 lb). His temperature is 98.5 °F (36.9 °C). His blood pressure is 140/88 (abnormal) and his pulse is 99. His respiration is 17 and oxygen saturation is 98%.     Chief Complaint: Cough    Pt is here for cough since 2 days ago. Pt took dayquil severe cold and flu and cough drops. He states that it is primarily dry. Cough was so bad today that he had to leave work, and could not sleep all night due to cough. Pt also has runny nose.Pt is a local.       Cough   This is a new problem. The current episode started in the past 7 days. The problem has been unchanged. The cough is non-productive. Associated symptoms include nasal congestion, postnasal drip and rhinorrhea. Pertinent negatives include no chills, ear pain, eye redness, fever, headaches, hemoptysis, myalgias, rash, sore throat, shortness of breath or wheezing. Nothing aggravates the symptoms. He has tried nothing for the symptoms. His past medical history is significant for bronchitis.       Constitution: Positive for sweating and fatigue. Negative for chills and fever.   HENT: Positive for postnasal drip. Negative for ear pain, congestion, sinus pain, sinus pressure, sore throat and voice change.    Neck: Negative for painful lymph nodes.   Eyes: Negative for eye redness.   Respiratory: Positive for cough. Negative for chest tightness, sputum production, bloody sputum, COPD, shortness of breath, stridor, wheezing and asthma.    Gastrointestinal: Negative for nausea and vomiting.   Musculoskeletal: Negative for muscle ache.   Skin: Negative for rash.   Allergic/Immunologic: Negative for seasonal allergies and asthma.   Neurological: Negative for headaches.   Hematologic/Lymphatic: Negative for swollen lymph nodes.       Objective:      Physical Exam   Constitutional: He is oriented to person, place, and time. He appears well-developed  and well-nourished. He is cooperative.  Non-toxic appearance. He does not appear ill. No distress.   HENT:   Head: Normocephalic and atraumatic.   Right Ear: Hearing, external ear and ear canal normal. A middle ear effusion is present.   Left Ear: Hearing, external ear and ear canal normal. A middle ear effusion is present.   Nose: Mucosal edema and rhinorrhea present. No nasal deformity. No epistaxis. Right sinus exhibits no maxillary sinus tenderness and no frontal sinus tenderness. Left sinus exhibits no maxillary sinus tenderness and no frontal sinus tenderness.   Mouth/Throat: Uvula is midline and mucous membranes are normal. No trismus in the jaw. Normal dentition. Uvula swelling present. Posterior oropharyngeal edema and posterior oropharyngeal erythema present.   Eyes: Conjunctivae and lids are normal. No scleral icterus.   Sclera clear bilat   Neck: Trachea normal, full passive range of motion without pain and phonation normal. Neck supple.   Cardiovascular: Normal rate, regular rhythm, normal heart sounds, intact distal pulses and normal pulses.   Pulmonary/Chest: Effort normal and breath sounds normal. No respiratory distress.   Abdominal: Soft. Normal appearance and bowel sounds are normal. He exhibits no distension. There is no tenderness.   Musculoskeletal: Normal range of motion. He exhibits no edema or deformity.   Neurological: He is alert and oriented to person, place, and time. He exhibits normal muscle tone. Coordination normal.   Skin: Skin is warm, dry and intact. He is not diaphoretic. No pallor.   Psychiatric: He has a normal mood and affect. His speech is normal and behavior is normal. Judgment and thought content normal. Cognition and memory are normal.   Nursing note and vitals reviewed.      Assessment:       1. Cough    2. Acute non-recurrent sinusitis, unspecified location        Plan:         Cough  -     POCT Influenza A/B    Acute non-recurrent sinusitis, unspecified  location    Other orders  -     betamethasone acetate-betamethasone sodium phosphate injection 6 mg  -     cefTRIAXone injection 500 mg  -     azithromycin (ZITHROMAX Z-DONALDO) 250 MG tablet; Take 2 tablets (500 mg) on  Day 1,  followed by 1 tablet (250 mg) once daily on Days 2 through 5.  Dispense: 6 tablet; Refill: 0  -     promethazine-dextromethorphan (PROMETHAZINE-DM) 6.25-15 mg/5 mL Syrp; Take 5 mLs by mouth every 4 to 6 hours as needed.  Dispense: 118 mL; Refill: 0  -     benzonatate (TESSALON) 200 MG capsule; Take 1 capsule (200 mg total) by mouth 3 (three) times daily as needed for Cough.  Dispense: 20 capsule; Refill: 0  -     fluticasone (FLONASE) 50 mcg/actuation nasal spray; 2 sprays (100 mcg total) by Each Nare route 2 (two) times daily.  Dispense: 1 Bottle; Refill: 0  -     cetirizine (ZYRTEC) 10 MG tablet; Take 1 tablet (10 mg total) by mouth once daily.  Dispense: 30 tablet; Refill: 2

## 2019-01-12 NOTE — PATIENT INSTRUCTIONS

## 2019-01-12 NOTE — LETTER
January 12, 2019      [facility]  [address] [address]  Phone: [phone]  Fax: [phone]       Patient: Darryl Becerra   YOB: 1980  Date of Visit: 01/12/2019    To Whom It May Concern:    Cristina Becerra  was at [facility] on 01/12/2019. He may return to work/school on 1/15/2019 with no restrictions. If you have any questions or concerns, or if I can be of further assistance, please do not hesitate to contact me.    Sincerely,    Yefri Morton N.P.

## 2019-01-15 ENCOUNTER — TELEPHONE (OUTPATIENT)
Dept: URGENT CARE | Facility: CLINIC | Age: 39
End: 2019-01-15

## 2019-11-21 ENCOUNTER — OFFICE VISIT (OUTPATIENT)
Dept: URGENT CARE | Facility: CLINIC | Age: 39
End: 2019-11-21
Payer: COMMERCIAL

## 2019-11-21 VITALS
HEART RATE: 64 BPM | OXYGEN SATURATION: 98 % | BODY MASS INDEX: 27.09 KG/M2 | DIASTOLIC BLOOD PRESSURE: 70 MMHG | HEIGHT: 72 IN | TEMPERATURE: 98 F | RESPIRATION RATE: 15 BRPM | SYSTOLIC BLOOD PRESSURE: 128 MMHG | WEIGHT: 200 LBS

## 2019-11-21 DIAGNOSIS — Z87.442 HISTORY OF KIDNEY STONES: ICD-10-CM

## 2019-11-21 DIAGNOSIS — R10.9 LEFT FLANK PAIN: ICD-10-CM

## 2019-11-21 DIAGNOSIS — N20.0 RENAL LITHIASIS: Primary | ICD-10-CM

## 2019-11-21 LAB
BILIRUB UR QL STRIP: NEGATIVE
GLUCOSE UR QL STRIP: NEGATIVE
KETONES UR QL STRIP: POSITIVE
LEUKOCYTE ESTERASE UR QL STRIP: NEGATIVE
PH, POC UA: 5 (ref 5–8)
POC BLOOD, URINE: NEGATIVE
POC NITRATES, URINE: NEGATIVE
PROT UR QL STRIP: POSITIVE
SP GR UR STRIP: 1.02 (ref 1–1.03)
UROBILINOGEN UR STRIP-ACNC: 1 (ref 0.3–2.2)

## 2019-11-21 PROCEDURE — 3008F PR BODY MASS INDEX (BMI) DOCUMENTED: ICD-10-PCS | Mod: CPTII,S$GLB,, | Performed by: NURSE PRACTITIONER

## 2019-11-21 PROCEDURE — 3078F DIAST BP <80 MM HG: CPT | Mod: CPTII,S$GLB,, | Performed by: NURSE PRACTITIONER

## 2019-11-21 PROCEDURE — 3074F PR MOST RECENT SYSTOLIC BLOOD PRESSURE < 130 MM HG: ICD-10-PCS | Mod: CPTII,S$GLB,, | Performed by: NURSE PRACTITIONER

## 2019-11-21 PROCEDURE — 3078F PR MOST RECENT DIASTOLIC BLOOD PRESSURE < 80 MM HG: ICD-10-PCS | Mod: CPTII,S$GLB,, | Performed by: NURSE PRACTITIONER

## 2019-11-21 PROCEDURE — 96372 PR INJECTION,THERAP/PROPH/DIAG2ST, IM OR SUBCUT: ICD-10-PCS | Mod: S$GLB,,, | Performed by: EMERGENCY MEDICINE

## 2019-11-21 PROCEDURE — 99214 OFFICE O/P EST MOD 30 MIN: CPT | Mod: 25,S$GLB,, | Performed by: NURSE PRACTITIONER

## 2019-11-21 PROCEDURE — 74018 XR KUB: ICD-10-PCS | Mod: FY,S$GLB,, | Performed by: RADIOLOGY

## 2019-11-21 PROCEDURE — 96372 THER/PROPH/DIAG INJ SC/IM: CPT | Mod: S$GLB,,, | Performed by: EMERGENCY MEDICINE

## 2019-11-21 PROCEDURE — 99214 PR OFFICE/OUTPT VISIT, EST, LEVL IV, 30-39 MIN: ICD-10-PCS | Mod: 25,S$GLB,, | Performed by: NURSE PRACTITIONER

## 2019-11-21 PROCEDURE — 81003 POCT URINALYSIS, DIPSTICK, AUTOMATED, W/O SCOPE: ICD-10-PCS | Mod: QW,S$GLB,, | Performed by: NURSE PRACTITIONER

## 2019-11-21 PROCEDURE — 3008F BODY MASS INDEX DOCD: CPT | Mod: CPTII,S$GLB,, | Performed by: NURSE PRACTITIONER

## 2019-11-21 PROCEDURE — 74018 RADEX ABDOMEN 1 VIEW: CPT | Mod: FY,S$GLB,, | Performed by: RADIOLOGY

## 2019-11-21 PROCEDURE — 81003 URINALYSIS AUTO W/O SCOPE: CPT | Mod: QW,S$GLB,, | Performed by: NURSE PRACTITIONER

## 2019-11-21 PROCEDURE — 3074F SYST BP LT 130 MM HG: CPT | Mod: CPTII,S$GLB,, | Performed by: NURSE PRACTITIONER

## 2019-11-21 RX ORDER — NAPROXEN 500 MG/1
500 TABLET ORAL 2 TIMES DAILY WITH MEALS
Qty: 20 TABLET | Refills: 0 | Status: SHIPPED | OUTPATIENT
Start: 2019-11-21 | End: 2019-12-01

## 2019-11-21 RX ORDER — TAMSULOSIN HYDROCHLORIDE 0.4 MG/1
0.4 CAPSULE ORAL DAILY
Qty: 30 CAPSULE | Refills: 11 | Status: SHIPPED | OUTPATIENT
Start: 2019-11-21 | End: 2021-03-04

## 2019-11-21 RX ORDER — KETOROLAC TROMETHAMINE 30 MG/ML
30 INJECTION, SOLUTION INTRAMUSCULAR; INTRAVENOUS
Status: COMPLETED | OUTPATIENT
Start: 2019-11-21 | End: 2019-11-21

## 2019-11-21 RX ADMIN — KETOROLAC TROMETHAMINE 30 MG: 30 INJECTION, SOLUTION INTRAMUSCULAR; INTRAVENOUS at 03:11

## 2019-11-21 NOTE — PROGRESS NOTES
"Subjective:       Patient ID: Darryl Becerra is a 39 y.o. male.    Vitals:  height is 6' (1.829 m) and weight is 90.7 kg (200 lb). His temperature is 98 °F (36.7 °C). His blood pressure is 128/70 and his pulse is 64. His respiration is 15 and oxygen saturation is 98%.     Chief Complaint: Flank Pain    Patient has a history of kidney stones, he has had a couple of days of back pain and is tender to touch in the area.  He has not tried any new foods and states" to have sever abdomen pain".  Patient has been taking Aleve for this problem.    Provider note begins below:    3 days of unremitting left flank pain. Pain is worse with movement, deep inhalation, cough, etc. Has taken NSAIDs with mixed results.  No N/V/D.  No fever or chills. Reduced appetite since onset and states "miserable".    Significant Hx of nephrolithiasis and states this feels the same as last.  Was hoping it would pass, but no relief.    Flank Pain   This is a new problem. The current episode started in the past 7 days. The problem has been gradually worsening since onset. The pain is at a severity of 8/10. The pain is moderate. Associated symptoms include abdominal pain. Pertinent negatives include no chest pain, dysuria, fever or pelvic pain.       Constitution: Negative for appetite change, chills, sweating and fever.   Cardiovascular: Negative for chest pain.   Respiratory: Negative for shortness of breath.    Gastrointestinal: Positive for abdominal pain. Negative for abdominal trauma, abdominal bloating, history of abdominal surgery, nausea, vomiting, constipation, diarrhea, dark colored stools and heartburn.   Genitourinary: Positive for flank pain. Negative for dysuria and pelvic pain.   Musculoskeletal: Positive for pain and back pain.       Objective:      Physical Exam   Constitutional: He is oriented to person, place, and time. He appears well-developed and well-nourished. No distress.   HENT:   Head: Normocephalic and atraumatic. "   Right Ear: External ear normal.   Left Ear: External ear normal.   Nose: Nose normal. No nasal deformity. No epistaxis.   Mouth/Throat: Mucous membranes are normal.   Eyes: Conjunctivae and lids are normal.   Neck: Trachea normal, normal range of motion and phonation normal. Neck supple.   Cardiovascular: Normal rate, regular rhythm, normal heart sounds and intact distal pulses.   Pulmonary/Chest: Effort normal and breath sounds normal.   Abdominal: Soft. Normal appearance and bowel sounds are normal. He exhibits no distension. There is no tenderness. There is CVA tenderness.   Left CVA tenderness   Musculoskeletal: Normal range of motion.   Neurological: He is alert and oriented to person, place, and time. He has normal reflexes.   Skin: Skin is warm, dry, intact and not diaphoretic.   Psychiatric: He has a normal mood and affect. His speech is normal and behavior is normal. Judgment and thought content normal.   Nursing note and vitals reviewed.    Results for orders placed or performed in visit on 11/21/19   POCT Urinalysis, Dipstick, Automated, W/O Scope   Result Value Ref Range    POC Blood, Urine Negative Negative    POC Bilirubin, Urine Negative Negative    POC Urobilinogen, Urine 1.0 0.3 - 2.2    POC Ketones, Urine Positive (A) Negative    POC Protein, Urine Positive (A) Negative    POC Nitrates, Urine Negative Negative    POC Glucose, Urine Negative Negative    pH, UA 5.0 5 - 8    POC Specific Gravity, Urine 1.025 1.003 - 1.029    POC Leukocytes, Urine Negative Negative     Xr Kub    Result Date: 11/21/2019  EXAMINATION: XR KUB CLINICAL HISTORY: Unspecified abdominal pain TECHNIQUE: Single AP supine view of the abdomen (KUB) was performed COMPARISON: None FINDINGS: A cluster of small calcifications is projected over the lower pole of the left kidney.  Intestinal gas and fecal matter obscure the right kidney and upper pole of the left kidney.  Rounded calcifications within the pelvis have the appearance of  phleboliths.  Mild curvature of the thoracolumbar spine, convex toward the left at the thoracolumbar junction.  Moderate hypertrophic degenerative changes.  Some degenerative changes at both hips.  Intestinal gas pattern is within normal limits.  No dilated bowel loops.  Nothing to suggest free air.     Probable left renal calculi.  DJD. Electronically signed by: Fernando Mathur MD Date:    11/21/2019 Time:    15:21      Moderate improvement in pain after Toradol injection.      Assessment:       1. Renal lithiasis    2. History of kidney stones    3. Left flank pain        Plan:       Patient given urine strainer and collection cup to bring any excreted stones to Urology.    Renal lithiasis  -     naproxen (NAPROSYN) 500 MG tablet; Take 1 tablet (500 mg total) by mouth 2 (two) times daily with meals. for 10 days  Dispense: 20 tablet; Refill: 0  -     tamsulosin (FLOMAX) 0.4 mg Cap; Take 1 capsule (0.4 mg total) by mouth once daily.  Dispense: 30 capsule; Refill: 11  -     Ambulatory referral to Urology    History of kidney stones  -     POCT Urinalysis, Dipstick, Automated, W/O Scope  -     Ambulatory referral to Urology    Left flank pain  -     ketorolac injection 30 mg  -     XR KUB; Future; Expected date: 11/21/2019  -     naproxen (NAPROSYN) 500 MG tablet; Take 1 tablet (500 mg total) by mouth 2 (two) times daily with meals. for 10 days  Dispense: 20 tablet; Refill: 0      Patient Instructions   You received an injection of a powerful NSAID today (Toradol).  It's effects will last up to 24 hours. Please do not take another NSAID (ie aspirin, ibuprofen, Aleve, Advil or Motrin) until this time tomorrow.  If you continue to have pain, you may take Tylenol (acetaminophen) if you are not allergic to this medication.      Kidney Stone (with Pain)    The sharp cramping pain on either side of your lower back and nausea/vomiting that you have are because of a small stone that has formed in the kidney. It is now passing  down a narrow tube (ureter) on its way to your bladder. Once the stone reaches your bladder, the pain will often stop. But it may come back as the stone continues to pass out of the bladder and through the urethra. The stone may pass in your urine stream in one piece. The size may be 1/16 inch to 1/4 inch (1 mm to 6 mm). Or, the stone may break up into isa fragments that you may not even notice.  Once you have had a kidney stone, you are at risk of getting another one in the future. There are 4 types of kidney stones. Eighty percent are calcium stones--mostly calcium oxalate but also some with calcium phosphate. The other 3 types include uric acid stones, struvite stones (from a preceding infection), and rarely, cystine stones.  Most stones will pass on their own, but may take from a few hours to a few days. Sometimes the stone is too large to pass by itself. In that case, the healthcare provider will need to use other ways to remove the stone. These techniques include:  · Lithotripsy. This uses ultrasound waves to break up the stone.  · Ureteroscopy. This pushes a basket-like instrument through the urethra and bladder and into the ureter to pull out the stone.  · Various types of direct surgery through the skin  Home care  The following are general care guidelines:  · Drink plenty of fluids. This means at least 12, 8-ounce glasses of fluid--mostly water--a day.  · Each time you urinate, do so in a jar. Pour the urine from the jar through the strainer and into the toilet. Continue doing this until 24 hours after your pain stops. By then, if there was a kidney stone, it should pass from your bladder. Some stones dissolve into sand-like particles and pass right through the strainer. In that case, you wont ever see a stone.  · Save any stone that you find in the strainer and bring it to your healthcare provider to look at. It may be possible to stop certain types of stones from forming. For this reason, it is  important to know what kind of stone you have.  · Try to stay as active as possible. This will help the stone pass. Don't stay in bed unless your pain keeps you from getting up. You may notice a red, pink, or brown color to your urine. This is normal while passing a kidney stone.  · If you develop pain, you may take ibuprofen or naproxen for pain, unless another medicine was prescribed. If you have chronic liver or kidney disease, talk with your healthcare provider before taking these medicines. Also talk with your provider if you've had a stomach ulcer or GI bleeding.  Preventing stones  Each year for the next 5 to 7 years, you are at risk that a new stone will form. Your risk is a 50% chance over this time period. The risk is higher if you have a family history of kidney stones or have certain chronic illnesses like hypertension, obesity, or diabetes. But you can make changes to your lifestyle and diet that can lower your risk for another stone.  Most kidney stones are made of calcium. The following is advice for preventing another calcium stone. If you dont know the type of stone you have, follow this advice until the cause of your stone is found.  Things that help:  · The most important thing you can do is to drink plenty of fluids each day. See home care above.   · Eat foods that contain phytates. These include wheat, rice, rye, barley, and beans. Phytates are substances that may lower your risk for any type of stone to form.  · Eat more fruits and vegetables. Choose those that are high in potassium.  · Eat foods high in natural citrate like fruit and low-sugar fruit juices.  · Having too little calcium in your diet can put you at risk for calcium kidney stones. Eat a normal amount of calcium in your diet and talk with your healthcare provider if you are taking calcium supplements. Cutting back on your calcium intake may raise your risk. New research shows that eating calcium-rich and oxalate-rich foods  together lowers your risk for stones by binding the minerals in the stomach and intestines before they can reach the kidneys.    · Limit salt intake to 2 grams (1 teaspoon) per day. Use limited amounts when cooking, and dont add salt at the table. Processed and canned foods are usually high in salt.   · Spinach, rhubarb, peanuts, cashews, almonds, grapefruit, and grapefruit juice are all high oxalate foods. You should limit how much of these you eat. Or eat them with calcium-rich foods. These include dairy products, dark leafy greens, soy products, and calcium-enriched foods.  · Reducing the amount of animal meat and high protein foods in your diet may lower your risk for uric acid stones.  · Avoid excess sugar (sucrose) and fructose (sweetener in many soft drinks) in your diet.   · If you take vitamin C as a supplement, don't take more than 1,000 mg a day.  · A dietitian or your healthcare provider can give you information about changes in your diet that will help prevent more kidney stones from forming.  Follow-up care  Follow up with your healthcare provider, or as advised, if the pain lasts more than 48 hours. Talk with your provider about urine and blood tests to find out the cause of your stone. If you had an X-ray, CT scan, or other diagnostic test, you will be told of any new findings that may affect your care.  Call 911  Call 911 if you have any of these:  · Weakness, dizziness, or fainting  When to seek medical advice  Call your healthcare provider right away if any of these occur:  · Pain that is not controlled by the medicine given  · Repeated vomiting or unable to keep down fluids  · Fever of 100.4ºF (38ºC) or higher, or as directed by your healthcare provider  · Passage of solid red or brown urine (can't see through it) or urine with lots of blood clots  · Foul-smelling or cloudy urine  · Unable to pass urine for 8 hours and increasing bladder pressure  Date Last Reviewed: 10/1/2016  © 8585-6398 [employer]. [address] [place] [address], [place] [address]. All rights reserved. This information is not intended as a substitute for professional medical care. Always follow your healthcare professional's instructions.

## 2019-11-21 NOTE — PATIENT INSTRUCTIONS
You received an injection of a powerful NSAID today (Toradol).  It's effects will last up to 24 hours. Please do not take another NSAID (ie aspirin, ibuprofen, Aleve, Advil or Motrin) until this time tomorrow.  If you continue to have pain, you may take Tylenol (acetaminophen) if you are not allergic to this medication.      Kidney Stone (with Pain)    The sharp cramping pain on either side of your lower back and nausea/vomiting that you have are because of a small stone that has formed in the kidney. It is now passing down a narrow tube (ureter) on its way to your bladder. Once the stone reaches your bladder, the pain will often stop. But it may come back as the stone continues to pass out of the bladder and through the urethra. The stone may pass in your urine stream in one piece. The size may be 1/16 inch to 1/4 inch (1 mm to 6 mm). Or, the stone may break up into isa fragments that you may not even notice.  Once you have had a kidney stone, you are at risk of getting another one in the future. There are 4 types of kidney stones. Eighty percent are calcium stones--mostly calcium oxalate but also some with calcium phosphate. The other 3 types include uric acid stones, struvite stones (from a preceding infection), and rarely, cystine stones.  Most stones will pass on their own, but may take from a few hours to a few days. Sometimes the stone is too large to pass by itself. In that case, the healthcare provider will need to use other ways to remove the stone. These techniques include:  · Lithotripsy. This uses ultrasound waves to break up the stone.  · Ureteroscopy. This pushes a basket-like instrument through the urethra and bladder and into the ureter to pull out the stone.  · Various types of direct surgery through the skin  Home care  The following are general care guidelines:  · Drink plenty of fluids. This means at least 12, 8-ounce glasses of fluid--mostly water--a day.  · Each time you urinate, do so in a  jar. Pour the urine from the jar through the strainer and into the toilet. Continue doing this until 24 hours after your pain stops. By then, if there was a kidney stone, it should pass from your bladder. Some stones dissolve into sand-like particles and pass right through the strainer. In that case, you wont ever see a stone.  · Save any stone that you find in the strainer and bring it to your healthcare provider to look at. It may be possible to stop certain types of stones from forming. For this reason, it is important to know what kind of stone you have.  · Try to stay as active as possible. This will help the stone pass. Don't stay in bed unless your pain keeps you from getting up. You may notice a red, pink, or brown color to your urine. This is normal while passing a kidney stone.  · If you develop pain, you may take ibuprofen or naproxen for pain, unless another medicine was prescribed. If you have chronic liver or kidney disease, talk with your healthcare provider before taking these medicines. Also talk with your provider if you've had a stomach ulcer or GI bleeding.  Preventing stones  Each year for the next 5 to 7 years, you are at risk that a new stone will form. Your risk is a 50% chance over this time period. The risk is higher if you have a family history of kidney stones or have certain chronic illnesses like hypertension, obesity, or diabetes. But you can make changes to your lifestyle and diet that can lower your risk for another stone.  Most kidney stones are made of calcium. The following is advice for preventing another calcium stone. If you dont know the type of stone you have, follow this advice until the cause of your stone is found.  Things that help:  · The most important thing you can do is to drink plenty of fluids each day. See home care above.   · Eat foods that contain phytates. These include wheat, rice, rye, barley, and beans. Phytates are substances that may lower your risk  for any type of stone to form.  · Eat more fruits and vegetables. Choose those that are high in potassium.  · Eat foods high in natural citrate like fruit and low-sugar fruit juices.  · Having too little calcium in your diet can put you at risk for calcium kidney stones. Eat a normal amount of calcium in your diet and talk with your healthcare provider if you are taking calcium supplements. Cutting back on your calcium intake may raise your risk. New research shows that eating calcium-rich and oxalate-rich foods together lowers your risk for stones by binding the minerals in the stomach and intestines before they can reach the kidneys.    · Limit salt intake to 2 grams (1 teaspoon) per day. Use limited amounts when cooking, and dont add salt at the table. Processed and canned foods are usually high in salt.   · Spinach, rhubarb, peanuts, cashews, almonds, grapefruit, and grapefruit juice are all high oxalate foods. You should limit how much of these you eat. Or eat them with calcium-rich foods. These include dairy products, dark leafy greens, soy products, and calcium-enriched foods.  · Reducing the amount of animal meat and high protein foods in your diet may lower your risk for uric acid stones.  · Avoid excess sugar (sucrose) and fructose (sweetener in many soft drinks) in your diet.   · If you take vitamin C as a supplement, don't take more than 1,000 mg a day.  · A dietitian or your healthcare provider can give you information about changes in your diet that will help prevent more kidney stones from forming.  Follow-up care  Follow up with your healthcare provider, or as advised, if the pain lasts more than 48 hours. Talk with your provider about urine and blood tests to find out the cause of your stone. If you had an X-ray, CT scan, or other diagnostic test, you will be told of any new findings that may affect your care.  Call 911  Call 911 if you have any of these:  · Weakness, dizziness, or fainting  When  to seek medical advice  Call your healthcare provider right away if any of these occur:  · Pain that is not controlled by the medicine given  · Repeated vomiting or unable to keep down fluids  · Fever of 100.4ºF (38ºC) or higher, or as directed by your healthcare provider  · Passage of solid red or brown urine (can't see through it) or urine with lots of blood clots  · Foul-smelling or cloudy urine  · Unable to pass urine for 8 hours and increasing bladder pressure  Date Last Reviewed: 10/1/2016  © 0008-1781 [employer]. [address] [place] [address]. All rights reserved. This information is not intended as a substitute for professional medical care. Always follow your healthcare professional's instructions.

## 2019-11-24 ENCOUNTER — TELEPHONE (OUTPATIENT)
Dept: URGENT CARE | Facility: CLINIC | Age: 39
End: 2019-11-24

## 2019-12-13 ENCOUNTER — OFFICE VISIT (OUTPATIENT)
Dept: URGENT CARE | Facility: CLINIC | Age: 39
End: 2019-12-13
Payer: COMMERCIAL

## 2019-12-13 VITALS
HEART RATE: 114 BPM | BODY MASS INDEX: 27.09 KG/M2 | RESPIRATION RATE: 15 BRPM | HEIGHT: 72 IN | SYSTOLIC BLOOD PRESSURE: 138 MMHG | OXYGEN SATURATION: 98 % | TEMPERATURE: 97 F | WEIGHT: 200 LBS | DIASTOLIC BLOOD PRESSURE: 86 MMHG

## 2019-12-13 DIAGNOSIS — N20.0 NEPHROLITHIASIS: Primary | ICD-10-CM

## 2019-12-13 DIAGNOSIS — M54.6 ACUTE LEFT-SIDED THORACIC BACK PAIN: ICD-10-CM

## 2019-12-13 LAB
BILIRUB UR QL STRIP: POSITIVE
GLUCOSE SERPL-MCNC: 120 MG/DL (ref 70–110)
GLUCOSE UR QL STRIP: NEGATIVE
KETONES UR QL STRIP: NEGATIVE
LEUKOCYTE ESTERASE UR QL STRIP: NEGATIVE
PH, POC UA: 6 (ref 5–8)
POC ANION GAP: 14 MMOL/L (ref 10–20)
POC BLOOD, URINE: NEGATIVE
POC BUN: 17 MMOL/L (ref 8–26)
POC CHLORIDE: 104 MMOL/L (ref 98–109)
POC CREATININE: 0.8 MG/DL (ref 0.6–1.3)
POC HEMATOCRIT: 46 %PCV (ref 42–52)
POC HEMOGLOBIN: 15.6 G/DL (ref 13.5–18)
POC ICA: 1.29 MMOL/L (ref 1.12–1.32)
POC NITRATES, URINE: NEGATIVE
POC POTASSIUM: 4.1 MMOL/L (ref 3.5–4.9)
POC SODIUM: 136 MMOL/L (ref 138–146)
POC TCO2: 23 MMOL/L (ref 24–29)
PROT UR QL STRIP: POSITIVE
SP GR UR STRIP: 1.02 (ref 1–1.03)
UROBILINOGEN UR STRIP-ACNC: NEGATIVE (ref 0.3–2.2)

## 2019-12-13 PROCEDURE — 96372 THER/PROPH/DIAG INJ SC/IM: CPT | Mod: S$GLB,,, | Performed by: EMERGENCY MEDICINE

## 2019-12-13 PROCEDURE — 74018 XR KUB: ICD-10-PCS | Mod: FY,S$GLB,, | Performed by: RADIOLOGY

## 2019-12-13 PROCEDURE — 81003 URINALYSIS AUTO W/O SCOPE: CPT | Mod: QW,S$GLB,, | Performed by: NURSE PRACTITIONER

## 2019-12-13 PROCEDURE — 99214 OFFICE O/P EST MOD 30 MIN: CPT | Mod: 25,S$GLB,, | Performed by: NURSE PRACTITIONER

## 2019-12-13 PROCEDURE — 99214 PR OFFICE/OUTPT VISIT, EST, LEVL IV, 30-39 MIN: ICD-10-PCS | Mod: 25,S$GLB,, | Performed by: NURSE PRACTITIONER

## 2019-12-13 PROCEDURE — 96372 PR INJECTION,THERAP/PROPH/DIAG2ST, IM OR SUBCUT: ICD-10-PCS | Mod: S$GLB,,, | Performed by: EMERGENCY MEDICINE

## 2019-12-13 PROCEDURE — 80047 BASIC METABLC PNL IONIZED CA: CPT | Mod: QW,S$GLB,, | Performed by: NURSE PRACTITIONER

## 2019-12-13 PROCEDURE — 80047 POCT CHEMISTRY PANEL: ICD-10-PCS | Mod: QW,S$GLB,, | Performed by: NURSE PRACTITIONER

## 2019-12-13 PROCEDURE — 74018 RADEX ABDOMEN 1 VIEW: CPT | Mod: FY,S$GLB,, | Performed by: RADIOLOGY

## 2019-12-13 PROCEDURE — 81003 POCT URINALYSIS, DIPSTICK, AUTOMATED, W/O SCOPE: ICD-10-PCS | Mod: QW,S$GLB,, | Performed by: NURSE PRACTITIONER

## 2019-12-13 RX ORDER — KETOROLAC TROMETHAMINE 30 MG/ML
30 INJECTION, SOLUTION INTRAMUSCULAR; INTRAVENOUS
Status: COMPLETED | OUTPATIENT
Start: 2019-12-13 | End: 2019-12-13

## 2019-12-13 RX ADMIN — KETOROLAC TROMETHAMINE 30 MG: 30 INJECTION, SOLUTION INTRAMUSCULAR; INTRAVENOUS at 10:12

## 2019-12-13 NOTE — PATIENT INSTRUCTIONS
You received an injection of a powerful NSAID today (Toradol).  It's effects will last up to 24 hours. Please do not take another NSAID (ie aspirin, ibuprofen, Aleve, Advil or Motrin) until this time tomorrow.  If you continue to have pain, you may take Tylenol (acetaminophen) if you are not allergic to this medication.    Resume taking Flomax and straining urine until seen by Urology.    Kidney Stone (with Pain)    The sharp cramping pain on either side of your lower back and nausea/vomiting that you have are because of a small stone that has formed in the kidney. It is now passing down a narrow tube (ureter) on its way to your bladder. Once the stone reaches your bladder, the pain will often stop. But it may come back as the stone continues to pass out of the bladder and through the urethra. The stone may pass in your urine stream in one piece. The size may be 1/16 inch to 1/4 inch (1 mm to 6 mm). Or, the stone may break up into isa fragments that you may not even notice.  Once you have had a kidney stone, you are at risk of getting another one in the future. There are 4 types of kidney stones. Eighty percent are calcium stones--mostly calcium oxalate but also some with calcium phosphate. The other 3 types include uric acid stones, struvite stones (from a preceding infection), and rarely, cystine stones.  Most stones will pass on their own, but may take from a few hours to a few days. Sometimes the stone is too large to pass by itself. In that case, the healthcare provider will need to use other ways to remove the stone. These techniques include:  · Lithotripsy. This uses ultrasound waves to break up the stone.  · Ureteroscopy. This pushes a basket-like instrument through the urethra and bladder and into the ureter to pull out the stone.  · Various types of direct surgery through the skin  Home care  The following are general care guidelines:  · Drink plenty of fluids. This means at least 12, 8-ounce glasses  of fluid--mostly water--a day.  · Each time you urinate, do so in a jar. Pour the urine from the jar through the strainer and into the toilet. Continue doing this until 24 hours after your pain stops. By then, if there was a kidney stone, it should pass from your bladder. Some stones dissolve into sand-like particles and pass right through the strainer. In that case, you wont ever see a stone.  · Save any stone that you find in the strainer and bring it to your healthcare provider to look at. It may be possible to stop certain types of stones from forming. For this reason, it is important to know what kind of stone you have.  · Try to stay as active as possible. This will help the stone pass. Don't stay in bed unless your pain keeps you from getting up. You may notice a red, pink, or brown color to your urine. This is normal while passing a kidney stone.  · If you develop pain, you may take ibuprofen or naproxen for pain, unless another medicine was prescribed. If you have chronic liver or kidney disease, talk with your healthcare provider before taking these medicines. Also talk with your provider if you've had a stomach ulcer or GI bleeding.  Preventing stones  Each year for the next 5 to 7 years, you are at risk that a new stone will form. Your risk is a 50% chance over this time period. The risk is higher if you have a family history of kidney stones or have certain chronic illnesses like hypertension, obesity, or diabetes. But you can make changes to your lifestyle and diet that can lower your risk for another stone.  Most kidney stones are made of calcium. The following is advice for preventing another calcium stone. If you dont know the type of stone you have, follow this advice until the cause of your stone is found.  Things that help:  · The most important thing you can do is to drink plenty of fluids each day. See home care above.   · Eat foods that contain phytates. These include wheat, rice, rye,  barley, and beans. Phytates are substances that may lower your risk for any type of stone to form.  · Eat more fruits and vegetables. Choose those that are high in potassium.  · Eat foods high in natural citrate like fruit and low-sugar fruit juices.  · Having too little calcium in your diet can put you at risk for calcium kidney stones. Eat a normal amount of calcium in your diet and talk with your healthcare provider if you are taking calcium supplements. Cutting back on your calcium intake may raise your risk. New research shows that eating calcium-rich and oxalate-rich foods together lowers your risk for stones by binding the minerals in the stomach and intestines before they can reach the kidneys.    · Limit salt intake to 2 grams (1 teaspoon) per day. Use limited amounts when cooking, and dont add salt at the table. Processed and canned foods are usually high in salt.   · Spinach, rhubarb, peanuts, cashews, almonds, grapefruit, and grapefruit juice are all high oxalate foods. You should limit how much of these you eat. Or eat them with calcium-rich foods. These include dairy products, dark leafy greens, soy products, and calcium-enriched foods.  · Reducing the amount of animal meat and high protein foods in your diet may lower your risk for uric acid stones.  · Avoid excess sugar (sucrose) and fructose (sweetener in many soft drinks) in your diet.   · If you take vitamin C as a supplement, don't take more than 1,000 mg a day.  · A dietitian or your healthcare provider can give you information about changes in your diet that will help prevent more kidney stones from forming.  Follow-up care  Follow up with your healthcare provider, or as advised, if the pain lasts more than 48 hours. Talk with your provider about urine and blood tests to find out the cause of your stone. If you had an X-ray, CT scan, or other diagnostic test, you will be told of any new findings that may affect your care.  Call 911  Call 911  if you have any of these:  · Weakness, dizziness, or fainting  When to seek medical advice  Call your healthcare provider right away if any of these occur:  · Pain that is not controlled by the medicine given  · Repeated vomiting or unable to keep down fluids  · Fever of 100.4ºF (38ºC) or higher, or as directed by your healthcare provider  · Passage of solid red or brown urine (can't see through it) or urine with lots of blood clots  · Foul-smelling or cloudy urine  · Unable to pass urine for 8 hours and increasing bladder pressure  Date Last Reviewed: 10/1/2016  © 5384-5908 [employer]. [address] [place] [address], [place] [address]. All rights reserved. This information is not intended as a substitute for professional medical care. Always follow your healthcare professional's instructions.

## 2019-12-13 NOTE — LETTER
December 13, 2019      [facility]  [facility] [address]  Phone: [phone]  Fax: [phone]       Patient: Darryl Becerra   YOB: 1980  Date of Visit: 12/13/2019    To Whom It May Concern:    Cristina Becerra  was at [facility] on 12/13/2019. He may return to work/school on 12/16/2019 with no restrictions. If you have any questions or concerns, or if I can be of further assistance, please do not hesitate to contact me.    Sincerely,          Joey Encarnacion, NP

## 2019-12-13 NOTE — PROGRESS NOTES
"Subjective:       Patient ID: Darryl Becerra is a 39 y.o. male.    Vitals:  height is 6' (1.829 m) and weight is 90.7 kg (200 lb). His temperature is 97.1 °F (36.2 °C). His blood pressure is 138/86 and his pulse is 114 (abnormal). His respiration is 15 and oxygen saturation is 98%.     Chief Complaint: Abdominal Pain    Patient was here 2 weeks ago for kidney stone pain. Since then the pain traveled up and has had trouble sleeping and feels left arm go numb/pain when lifting.    Provider note begins below:    No fever. No N/V/D.    Hx of renal lithiasis and assumed had passed stone (was seen at this clinic for similar issue 11/21).  Denies urinary symptoms or pati hematuria. States hurts to cough or press on back, twist, etc.     Also with complaint of right arm bruising without trauma. Denies DM, but has familial Hx and was previously Dx'd "pre-diabetic".     Patient in acute pain at time of visit. States unable to sleep last for pain.  Took Aleve at 230P with little or no relief.    Abdominal Pain   This is a new problem. The current episode started 1 to 4 weeks ago. The pain is located in the LUQ. The pain is at a severity of 9/10. The quality of the pain is sharp. The abdominal pain radiates to the LUQ. Pertinent negatives include no fever, myalgias, nausea or vomiting. The pain is aggravated by movement. He has tried acetaminophen for the symptoms.       Constitution: Negative for chills, sweating, fatigue and fever.   HENT: Negative for ear pain, congestion, sinus pain, sinus pressure, sore throat and voice change.    Neck: Negative for painful lymph nodes.   Eyes: Negative for eye redness.   Respiratory: Negative for chest tightness, cough, sputum production, bloody sputum, COPD, shortness of breath, stridor, wheezing and asthma.    Gastrointestinal: Positive for abdominal pain. Negative for nausea and vomiting.   Musculoskeletal: Negative for muscle ache.   Skin: Negative for rash. "   Allergic/Immunologic: Negative for seasonal allergies and asthma.   Hematologic/Lymphatic: Negative for swollen lymph nodes.       Objective:      Physical Exam   Constitutional: He is oriented to person, place, and time. He appears well-developed and well-nourished.  Non-toxic appearance. He does not have a sickly appearance. No distress.   Appears uncomfortable   HENT:   Head: Normocephalic and atraumatic.   Right Ear: External ear normal.   Left Ear: External ear normal.   Nose: Nose normal.   Mouth/Throat: Mucous membranes are normal.   Eyes: Conjunctivae and lids are normal.   Neck: Trachea normal and full passive range of motion without pain. Neck supple.   Cardiovascular: Normal rate, regular rhythm and normal heart sounds.   Pulmonary/Chest: Effort normal and breath sounds normal. No respiratory distress.   Abdominal: Soft. Normal appearance and bowel sounds are normal. He exhibits no distension, no abdominal bruit, no pulsatile midline mass and no mass. There is no tenderness. There is CVA tenderness.   Drop test: POS for left flank pain   Musculoskeletal: Normal range of motion. He exhibits no edema.   Neurological: He is alert and oriented to person, place, and time. He has normal strength.   Skin: Skin is warm, dry, intact, not diaphoretic and not pale.   Psychiatric: He has a normal mood and affect. His speech is normal and behavior is normal. Judgment and thought content normal. Cognition and memory are normal.   Nursing note and vitals reviewed.    Xr Kub    Result Date: 12/13/2019  EXAMINATION: XR KUB CLINICAL HISTORY: Pain in thoracic spine FINDINGS: No obstruction, ileus or perforation seen.  Findings are suspicious for bilateral nephrolithiasis.     Findings are worrisome for bilateral nephrolithiasis. Electronically signed by: Noel Mcdaniel MD Date:    12/13/2019 Time:    10:36    Xr Kub    Result Date: 11/21/2019  EXAMINATION: XR KUB CLINICAL HISTORY: Unspecified abdominal pain TECHNIQUE:  Single AP supine view of the abdomen (KUB) was performed COMPARISON: None FINDINGS: A cluster of small calcifications is projected over the lower pole of the left kidney.  Intestinal gas and fecal matter obscure the right kidney and upper pole of the left kidney.  Rounded calcifications within the pelvis have the appearance of phleboliths.  Mild curvature of the thoracolumbar spine, convex toward the left at the thoracolumbar junction.  Moderate hypertrophic degenerative changes.  Some degenerative changes at both hips.  Intestinal gas pattern is within normal limits.  No dilated bowel loops.  Nothing to suggest free air.     Probable left renal calculi.  DJD. Electronically signed by: Fernando Mathur MD Date:    11/21/2019 Time:    15:21        Assessment:       1. Nephrolithiasis    2. Acute left-sided thoracic back pain        Plan:       Moderate relief after Toradol injection.    Nephrolithiasis  -     Ambulatory referral to Urology    Acute left-sided thoracic back pain  -     XR KUB; Future; Expected date: 12/13/2019  -     POCT Urinalysis, Dipstick, Automated, W/O Scope  -     POCT Chemistry Panel  -     ketorolac injection 30 mg      Patient Instructions     You received an injection of a powerful NSAID today (Toradol).  It's effects will last up to 24 hours. Please do not take another NSAID (ie aspirin, ibuprofen, Aleve, Advil or Motrin) until this time tomorrow.  If you continue to have pain, you may take Tylenol (acetaminophen) if you are not allergic to this medication.    Resume taking Flomax and straining urine until seen by Urology.    Kidney Stone (with Pain)    The sharp cramping pain on either side of your lower back and nausea/vomiting that you have are because of a small stone that has formed in the kidney. It is now passing down a narrow tube (ureter) on its way to your bladder. Once the stone reaches your bladder, the pain will often stop. But it may come back as the stone continues to pass out of  the bladder and through the urethra. The stone may pass in your urine stream in one piece. The size may be 1/16 inch to 1/4 inch (1 mm to 6 mm). Or, the stone may break up into isa fragments that you may not even notice.  Once you have had a kidney stone, you are at risk of getting another one in the future. There are 4 types of kidney stones. Eighty percent are calcium stones--mostly calcium oxalate but also some with calcium phosphate. The other 3 types include uric acid stones, struvite stones (from a preceding infection), and rarely, cystine stones.  Most stones will pass on their own, but may take from a few hours to a few days. Sometimes the stone is too large to pass by itself. In that case, the healthcare provider will need to use other ways to remove the stone. These techniques include:  · Lithotripsy. This uses ultrasound waves to break up the stone.  · Ureteroscopy. This pushes a basket-like instrument through the urethra and bladder and into the ureter to pull out the stone.  · Various types of direct surgery through the skin  Home care  The following are general care guidelines:  · Drink plenty of fluids. This means at least 12, 8-ounce glasses of fluid--mostly water--a day.  · Each time you urinate, do so in a jar. Pour the urine from the jar through the strainer and into the toilet. Continue doing this until 24 hours after your pain stops. By then, if there was a kidney stone, it should pass from your bladder. Some stones dissolve into sand-like particles and pass right through the strainer. In that case, you wont ever see a stone.  · Save any stone that you find in the strainer and bring it to your healthcare provider to look at. It may be possible to stop certain types of stones from forming. For this reason, it is important to know what kind of stone you have.  · Try to stay as active as possible. This will help the stone pass. Don't stay in bed unless your pain keeps you from getting up. You  may notice a red, pink, or brown color to your urine. This is normal while passing a kidney stone.  · If you develop pain, you may take ibuprofen or naproxen for pain, unless another medicine was prescribed. If you have chronic liver or kidney disease, talk with your healthcare provider before taking these medicines. Also talk with your provider if you've had a stomach ulcer or GI bleeding.  Preventing stones  Each year for the next 5 to 7 years, you are at risk that a new stone will form. Your risk is a 50% chance over this time period. The risk is higher if you have a family history of kidney stones or have certain chronic illnesses like hypertension, obesity, or diabetes. But you can make changes to your lifestyle and diet that can lower your risk for another stone.  Most kidney stones are made of calcium. The following is advice for preventing another calcium stone. If you dont know the type of stone you have, follow this advice until the cause of your stone is found.  Things that help:  · The most important thing you can do is to drink plenty of fluids each day. See home care above.   · Eat foods that contain phytates. These include wheat, rice, rye, barley, and beans. Phytates are substances that may lower your risk for any type of stone to form.  · Eat more fruits and vegetables. Choose those that are high in potassium.  · Eat foods high in natural citrate like fruit and low-sugar fruit juices.  · Having too little calcium in your diet can put you at risk for calcium kidney stones. Eat a normal amount of calcium in your diet and talk with your healthcare provider if you are taking calcium supplements. Cutting back on your calcium intake may raise your risk. New research shows that eating calcium-rich and oxalate-rich foods together lowers your risk for stones by binding the minerals in the stomach and intestines before they can reach the kidneys.    · Limit salt intake to 2 grams (1 teaspoon) per day. Use  limited amounts when cooking, and dont add salt at the table. Processed and canned foods are usually high in salt.   · Spinach, rhubarb, peanuts, cashews, almonds, grapefruit, and grapefruit juice are all high oxalate foods. You should limit how much of these you eat. Or eat them with calcium-rich foods. These include dairy products, dark leafy greens, soy products, and calcium-enriched foods.  · Reducing the amount of animal meat and high protein foods in your diet may lower your risk for uric acid stones.  · Avoid excess sugar (sucrose) and fructose (sweetener in many soft drinks) in your diet.   · If you take vitamin C as a supplement, don't take more than 1,000 mg a day.  · A dietitian or your healthcare provider can give you information about changes in your diet that will help prevent more kidney stones from forming.  Follow-up care  Follow up with your healthcare provider, or as advised, if the pain lasts more than 48 hours. Talk with your provider about urine and blood tests to find out the cause of your stone. If you had an X-ray, CT scan, or other diagnostic test, you will be told of any new findings that may affect your care.  Call 911  Call 911 if you have any of these:  · Weakness, dizziness, or fainting  When to seek medical advice  Call your healthcare provider right away if any of these occur:  · Pain that is not controlled by the medicine given  · Repeated vomiting or unable to keep down fluids  · Fever of 100.4ºF (38ºC) or higher, or as directed by your healthcare provider  · Passage of solid red or brown urine (can't see through it) or urine with lots of blood clots  · Foul-smelling or cloudy urine  · Unable to pass urine for 8 hours and increasing bladder pressure  Date Last Reviewed: 10/1/2016  © 7987-8415 [employer]. [address] [place] [address], [place] [address]. All rights reserved. This information is not intended as a substitute for professional medical care. Always follow your  healthcare professional's instructions.

## 2019-12-15 ENCOUNTER — TELEPHONE (OUTPATIENT)
Dept: URGENT CARE | Facility: CLINIC | Age: 39
End: 2019-12-15

## 2019-12-16 ENCOUNTER — TELEPHONE (OUTPATIENT)
Dept: URGENT CARE | Facility: CLINIC | Age: 39
End: 2019-12-16

## 2019-12-16 ENCOUNTER — OFFICE VISIT (OUTPATIENT)
Dept: UROLOGY | Facility: CLINIC | Age: 39
End: 2019-12-16
Payer: COMMERCIAL

## 2019-12-16 VITALS
WEIGHT: 200 LBS | BODY MASS INDEX: 27.09 KG/M2 | DIASTOLIC BLOOD PRESSURE: 83 MMHG | HEIGHT: 72 IN | SYSTOLIC BLOOD PRESSURE: 114 MMHG | HEART RATE: 94 BPM

## 2019-12-16 DIAGNOSIS — R82.90 ABNORMAL URINALYSIS: Primary | ICD-10-CM

## 2019-12-16 DIAGNOSIS — N20.0 NEPHROLITHIASIS: ICD-10-CM

## 2019-12-16 LAB
BILIRUB SERPL-MCNC: ABNORMAL MG/DL
BLOOD URINE, POC: ABNORMAL
COLOR, POC UA: YELLOW
GLUCOSE UR QL STRIP: ABNORMAL
KETONES UR QL STRIP: ABNORMAL
LEUKOCYTE ESTERASE URINE, POC: ABNORMAL
NITRITE, POC UA: ABNORMAL
PH, POC UA: 5
PROTEIN, POC: 30
SPECIFIC GRAVITY, POC UA: 1.02
UROBILINOGEN, POC UA: ABNORMAL

## 2019-12-16 PROCEDURE — 99203 OFFICE O/P NEW LOW 30 MIN: CPT | Mod: 25,S$GLB,, | Performed by: NURSE PRACTITIONER

## 2019-12-16 PROCEDURE — 99203 PR OFFICE/OUTPT VISIT, NEW, LEVL III, 30-44 MIN: ICD-10-PCS | Mod: 25,S$GLB,, | Performed by: NURSE PRACTITIONER

## 2019-12-16 PROCEDURE — 3008F BODY MASS INDEX DOCD: CPT | Mod: CPTII,S$GLB,, | Performed by: NURSE PRACTITIONER

## 2019-12-16 PROCEDURE — 81002 URINALYSIS NONAUTO W/O SCOPE: CPT | Mod: S$GLB,,, | Performed by: NURSE PRACTITIONER

## 2019-12-16 PROCEDURE — 3079F PR MOST RECENT DIASTOLIC BLOOD PRESSURE 80-89 MM HG: ICD-10-PCS | Mod: CPTII,S$GLB,, | Performed by: NURSE PRACTITIONER

## 2019-12-16 PROCEDURE — 87086 URINE CULTURE/COLONY COUNT: CPT

## 2019-12-16 PROCEDURE — 3074F SYST BP LT 130 MM HG: CPT | Mod: CPTII,S$GLB,, | Performed by: NURSE PRACTITIONER

## 2019-12-16 PROCEDURE — 81002 POCT URINE DIPSTICK WITHOUT MICROSCOPE: ICD-10-PCS | Mod: S$GLB,,, | Performed by: NURSE PRACTITIONER

## 2019-12-16 PROCEDURE — 3074F PR MOST RECENT SYSTOLIC BLOOD PRESSURE < 130 MM HG: ICD-10-PCS | Mod: CPTII,S$GLB,, | Performed by: NURSE PRACTITIONER

## 2019-12-16 PROCEDURE — 3079F DIAST BP 80-89 MM HG: CPT | Mod: CPTII,S$GLB,, | Performed by: NURSE PRACTITIONER

## 2019-12-16 PROCEDURE — 3008F PR BODY MASS INDEX (BMI) DOCUMENTED: ICD-10-PCS | Mod: CPTII,S$GLB,, | Performed by: NURSE PRACTITIONER

## 2019-12-16 RX ORDER — DILTIAZEM HYDROCHLORIDE 30 MG/1
30 TABLET, FILM COATED ORAL 4 TIMES DAILY
COMMUNITY
End: 2021-06-09

## 2019-12-16 RX ORDER — CARVEDILOL 6.25 MG/1
6.25 TABLET ORAL 2 TIMES DAILY WITH MEALS
COMMUNITY
End: 2021-06-09 | Stop reason: ALTCHOICE

## 2019-12-16 NOTE — LETTER
December 16, 2019      Joey Encarnacion NP  [address]  [place]  [facility] [address]           [facility], [facility] [address]  [address] [place] [address]  Phone: [phone]  Fax: [phone]          Patient: Darryl Becerra   MR Number: [id number]   YOB: 1980   Date of Visit: 12/16/2019       Dear Joey Encarnacion:    Thank you for referring Darryl Becerra to me for evaluation. Attached you will find relevant portions of my assessment and plan of care.    If you have questions, please do not hesitate to call me. I look forward to following Darryl Becerra along with you.    Sincerely,    Blossom Duncan, RONALD    Enclosure  CC:  No Recipients    If you would like to receive this communication electronically, please contact [email or web address] or [phone] to request more information on [employer] access.    For providers and/or their staff who would like to refer a patient to [facility], please contact us through our one-stop-shop provider referral line, [facility], at [phone].    If you feel you have received this communication in error or would no longer like to receive these types of communications, please e-mail [email or web address]

## 2019-12-16 NOTE — PROGRESS NOTES
"Subjective:      Darryl Becerra is a 39 y.o. male who was referred by Joey Encarnacion for evaluation of his nephrolithiasis.      History of nephrolithiasis- states that he passed a stone about 3-4 years ago. KUB on 12/13 showed "Findings are worrisome for bilateral nephrolithiasis." UA has not showed RBCs.     The patient presents today reporting left thoracic/rib pain and left arm numbness that began about 3 weeks ago. States that the pain is sharp and severe and is aggravated by movement and breathing. No relief with toradol. Denies urinary frequency, urgency, dysuria, and gross hematuria.+ nausea, denies vomiting.     The following portions of the patient's history were reviewed and updated as appropriate: allergies, current medications, past family history, past medical history, past social history, past surgical history and problem list.    Review of Systems  Constitutional: no fever or chills  ENT: no nasal congestion or sore throat  Respiratory: no cough or shortness of breath  Cardiovascular: no chest pain or palpitations  Gastrointestinal: no nausea or vomiting, tolerating diet  Genitourinary: as per HPI  Hematologic/Lymphatic: no easy bruising or lymphadenopathy  Musculoskeletal: no arthralgias or myalgias  Neurological: no seizures or tremors  Behavioral/Psych: no auditory or visual hallucinations     Objective:   Vitals:   Vitals:    12/16/19 0812   BP: 114/83   Pulse: 94       Physical Exam   General: alert and oriented, no acute distress  Head: normocephalic, atraumatic  Neck: supple, no lymphadenopathy, normal ROM, no masses  Respiratory: Symmetric expansion, non-labored breathing  Cardiovascular: regular rate and rhythm, nomal pulses, no peripheral edema  Abdomen: soft, non tender, non distended, no palpable masses, no hernias, no hepatomegaly or splenomegaly  Genitourinary: not examined   Lymphatic: no inguinal nodes  Skin: normal coloration and turgor, no rashes, no suspicious skin lesions " "noted  Neuro: alert and oriented x3, no gross deficits  Psych: normal judgment and insight, normal mood/affect and non-anxious  No CVA tenderness    Lab Review   Urinalysis demonstrates positive for leukocytes (trace), red blood cells (5-10 jada/mL), protein (trace)  Lab Results   Component Value Date    WBC 8.56 01/09/2015    HGB 14.7 01/09/2015    HCT 44.2 01/09/2015    MCV 96 01/09/2015     01/09/2015     Lab Results   Component Value Date    CREATININE 0.9 01/09/2015    BUN 16 01/09/2015     No results found for: PSA  Imaging   (all images personally reviewed; agree with report below)  KUB - "Findings are worrisome for bilateral nephrolithiasis"    Assessment:   Abnormal urinalysis  Nephrolithiasis    Plan:   Darryl was seen today for advice only.    Diagnoses and all orders for this visit:    Abnormal urinalysis  -     POCT URINE DIPSTICK WITHOUT MICROSCOPE  -     Urine culture    Nephrolithiasis      Plan:  --Urine culture  --Very low suspicion for stone given the description of pain, lack of CVA tenderness, and lack of RBCs on UA  --Hold on CT for now. I do not believe he would tolerate ABDOULAYE currently  --Follow up with his PCP. If symptoms persist may consider CT.   "

## 2019-12-17 LAB — BACTERIA UR CULT: NO GROWTH

## 2020-02-01 ENCOUNTER — OFFICE VISIT (OUTPATIENT)
Dept: URGENT CARE | Facility: CLINIC | Age: 40
End: 2020-02-01

## 2020-02-01 VITALS
DIASTOLIC BLOOD PRESSURE: 99 MMHG | SYSTOLIC BLOOD PRESSURE: 147 MMHG | HEIGHT: 72 IN | TEMPERATURE: 97 F | OXYGEN SATURATION: 95 % | WEIGHT: 195 LBS | HEART RATE: 142 BPM | RESPIRATION RATE: 16 BRPM | BODY MASS INDEX: 26.41 KG/M2

## 2020-02-01 DIAGNOSIS — J01.90 ACUTE SINUSITIS, RECURRENCE NOT SPECIFIED, UNSPECIFIED LOCATION: Primary | ICD-10-CM

## 2020-02-01 DIAGNOSIS — J20.9 ACUTE BRONCHITIS, UNSPECIFIED ORGANISM: ICD-10-CM

## 2020-02-01 LAB
CTP QC/QA: YES
FLUAV AG NPH QL: NEGATIVE
FLUBV AG NPH QL: NEGATIVE

## 2020-02-01 PROCEDURE — 96372 PR INJECTION,THERAP/PROPH/DIAG2ST, IM OR SUBCUT: ICD-10-PCS | Mod: S$GLB,,, | Performed by: EMERGENCY MEDICINE

## 2020-02-01 PROCEDURE — 99203 OFFICE O/P NEW LOW 30 MIN: CPT | Mod: TIER,25,S$GLB, | Performed by: EMERGENCY MEDICINE

## 2020-02-01 PROCEDURE — 96372 THER/PROPH/DIAG INJ SC/IM: CPT | Mod: S$GLB,,, | Performed by: EMERGENCY MEDICINE

## 2020-02-01 PROCEDURE — 87804 INFLUENZA ASSAY W/OPTIC: CPT | Mod: QW,S$GLB,, | Performed by: EMERGENCY MEDICINE

## 2020-02-01 PROCEDURE — 87804 POCT INFLUENZA A/B: ICD-10-PCS | Mod: 59,QW,S$GLB, | Performed by: EMERGENCY MEDICINE

## 2020-02-01 PROCEDURE — 99203 PR OFFICE/OUTPT VISIT, NEW, LEVL III, 30-44 MIN: ICD-10-PCS | Mod: TIER,25,S$GLB, | Performed by: EMERGENCY MEDICINE

## 2020-02-01 RX ORDER — CODEINE PHOSPHATE AND GUAIFENESIN 10; 100 MG/5ML; MG/5ML
5-10 SOLUTION ORAL 3 TIMES DAILY PRN
Qty: 120 ML | Refills: 0 | Status: SHIPPED | OUTPATIENT
Start: 2020-02-01 | End: 2020-02-06

## 2020-02-01 RX ORDER — DOXYCYCLINE 100 MG/1
100 CAPSULE ORAL EVERY 12 HOURS
Qty: 20 CAPSULE | Refills: 0 | Status: SHIPPED | OUTPATIENT
Start: 2020-02-01 | End: 2020-02-11

## 2020-02-01 RX ORDER — BETAMETHASONE SODIUM PHOSPHATE AND BETAMETHASONE ACETATE 3; 3 MG/ML; MG/ML
9 INJECTION, SUSPENSION INTRA-ARTICULAR; INTRALESIONAL; INTRAMUSCULAR; SOFT TISSUE
Status: COMPLETED | OUTPATIENT
Start: 2020-02-01 | End: 2020-02-01

## 2020-02-01 RX ADMIN — BETAMETHASONE SODIUM PHOSPHATE AND BETAMETHASONE ACETATE 9 MG: 3; 3 INJECTION, SUSPENSION INTRA-ARTICULAR; INTRALESIONAL; INTRAMUSCULAR; SOFT TISSUE at 12:02

## 2020-02-01 NOTE — LETTER
February 1, 2020      [facility]  [facility] [address]  Phone: [phone]  Fax: [phone]       Patient: Darryl Becerra   YOB: 1980  Date of Visit: 02/01/2020    To Whom It May Concern:    Cristina Becerra  was at [facility] on 02/01/2020. He may return to work/school on 2/5/20, earlier if feels better and no fever for 24 hours with no restrictions. If you have any questions or concerns, or if I can be of further assistance, please do not hesitate to contact me.    Sincerely,          Danilo Jacobson MD

## 2020-02-01 NOTE — PATIENT INSTRUCTIONS
Danilo Jacobson MD  Go to the Emergency Department for any problems  Call your PCP for follow up next available.    Bronchitis, Antibiotic Treatment (Adult)    Bronchitis is an infection of the air passages (bronchial tubes) in your lungs. It often occurs when you have a cold. This illness is contagious during the first few days and is spread through the air by coughing and sneezing, or by direct contact (touching the sick person and then touching your own eyes, nose, or mouth).  Symptoms of bronchitis include cough with mucus (phlegm) and low-grade fever. Bronchitis usually lasts 7 to 14 days. Mild cases can be treated with simple home remedies. More severe infection is treated with an antibiotic.  Home care  Follow these guidelines when caring for yourself at home:  · If your symptoms are severe, rest at home for the first 2 to 3 days. When you go back to your usual activities, don't let yourself get too tired.  · Do not smoke. Also avoid being exposed to secondhand smoke.  · You may use over-the-counter medicines to control fever or pain, unless another medicine was prescribed. (Note: If you have chronic liver or kidney disease or have ever had a stomach ulcer or gastrointestinal bleeding, talk with your healthcare provider before using these medicines. Also talk to your provider if you are taking medicine to prevent blood clots.) Aspirin should never be given to anyone younger than 18 years of age who is ill with a viral infection or fever. It may cause severe liver or brain damage.  · Your appetite may be poor, so a light diet is fine. Avoid dehydration by drinking 6 to 8 glasses of fluids per day (such as water, soft drinks, sports drinks, juices, tea, or soup). Extra fluids will help loosen secretions in the nose and lungs.  · Over-the-counter cough, cold, and sore-throat medicines will not shorten the length of the illness, but they may be helpful to reduce symptoms. (Note: Do not use decongestants if you  have high blood pressure.)  · Finish all antibiotic medicine. Do this even if you are feeling better after only a few days.  Follow-up care  Follow up with your healthcare provider, or as advised. If you had an X-ray or ECG (electrocardiogram), a specialist will review it. You will be notified of any new findings that may affect your care.  Note: If you are age 65 or older, or if you have a chronic lung disease or condition that affects your immune system, or you smoke, talk to your healthcare provider about having pneumococcal vaccinations and a yearly influenza vaccination (flu shot).  When to seek medical advice  Call your healthcare provider right away if any of these occur:  · Fever of 100.4°F (38°C) or higher  · Coughing up increased amounts of colored sputum  · Weakness, drowsiness, headache, facial pain, ear pain, or a stiff neck  Call 911, or get immediate medical care  Contact emergency services right away if any of these occur.  · Coughing up blood  · Worsening weakness, drowsiness, headache, or stiff neck  · Trouble breathing, wheezing, or pain with breathing  Date Last Reviewed: 9/13/2015  © 1308-9950 [employer]. [address] [place] [address]. All rights reserved. This information is not intended as a substitute for professional medical care. Always follow your healthcare professional's instructions.        Sinusitis (Antibiotic Treatment)    The sinuses are air-filled spaces within the bones of the face. They connect to the inside of the nose. Sinusitis is an inflammation of the tissue lining the sinus cavity. Sinus inflammation can occur during a cold. It can also be due to allergies to pollens and other particles in the air. Sinusitis can cause symptoms of sinus congestion and fullness. A sinus infection causes fever, headache and facial pain. There is often green or yellow drainage from the nose or into the back of the throat (post-nasal drip). You have been given  antibiotics to treat this condition.  Home care:  · Take the full course of antibiotics as instructed. Do not stop taking them, even if you feel better.  · Drink plenty of water, hot tea, and other liquids. This may help thin mucus. It also may promote sinus drainage.  · Heat may help soothe painful areas of the face. Use a towel soaked in hot water. Or,  the shower and direct the hot spray onto your face. Using a vaporizer along with a menthol rub at night may also help.   · An expectorant containing guaifenesin may help thin the mucus and promote drainage from the sinuses.  · Over-the-counter decongestants may be used unless a similar medicine was prescribed. Nasal sprays work the fastest. Use one that contains phenylephrine or oxymetazoline. First blow the nose gently. Then use the spray. Do not use these medicines more often than directed on the label or symptoms may get worse. You may also use tablets containing pseudoephedrine. Avoid products that combine ingredients, because side effects may be increased. Read labels. You can also ask the pharmacist for help. (NOTE: Persons with high blood pressure should not use decongestants. They can raise blood pressure.)  · Over-the-counter antihistamines may help if allergies contributed to your sinusitis.    · Do not use nasal rinses or irrigation during an acute sinus infection, unless told to by your health care provider. Rinsing may spread the infection to other sinuses.  · Use acetaminophen or ibuprofen to control pain, unless another pain medicine was prescribed. (If you have chronic liver or kidney disease or ever had a stomach ulcer, talk with your doctor before using these medicines. Aspirin should never be used in anyone under 18 years of age who is ill with a fever. It may cause severe liver damage.)  · Don't smoke. This can worsen symptoms.  Follow-up care  Follow up with your healthcare provider or our staff if you are not improving within the next  week.  When to seek medical advice  Call your healthcare provider if any of these occur:  · Facial pain or headache becoming more severe  · Stiff neck  · Unusual drowsiness or confusion  · Swelling of the forehead or eyelids  · Vision problems, including blurred or double vision  · Fever of 100.4ºF (38ºC) or higher, or as directed by your healthcare provider  · Seizure  · Breathing problems  · Symptoms not resolving within 10 days  Date Last Reviewed: 4/13/2015  © 7772-2046 [employer]. [address] [place] [address], [place] [address]. All rights reserved. This information is not intended as a substitute for professional medical care. Always follow your healthcare professional's instructions.

## 2020-02-01 NOTE — PROGRESS NOTES
Subjective:       Patient ID: Darryl Becerra is a 39 y.o. male.    Vitals:  height is 6' (1.829 m) and weight is 88.5 kg (195 lb). His temperature is 96.9 °F (36.1 °C). His blood pressure is 147/99 (abnormal) and his pulse is 142 (abnormal). His respiration is 16 and oxygen saturation is 95%.     Chief Complaint: URI and Cough    Patient c/o chest congestion, chest tightness, post nasal drip, runny nose, productive cough, bodyache, headache,sob, wheezing, night sweats, chills, fatigue and n/v. Been in contact with sick people. OTC daytime medication was taken with no relief.  OK with steroid IM/doxycycline/codeine cough med, c/o 3 d hx sinus drainage/mucus prod cough.    URI    This is a new problem. The current episode started in the past 7 days (3days ago). Associated symptoms include congestion, coughing, nausea, a sore throat, vomiting and wheezing. Pertinent negatives include no ear pain, rash or sinus pain. Treatments tried: daytime medication. The treatment provided no relief.       Constitution: Positive for sweating. Negative for chills, fatigue and fever.   HENT: Positive for congestion, postnasal drip and sore throat. Negative for ear pain, sinus pain, sinus pressure and voice change.    Neck: Negative for painful lymph nodes.   Eyes: Negative for eye redness.   Respiratory: Positive for chest tightness, cough, shortness of breath and wheezing. Negative for sputum production, bloody sputum, COPD, stridor and asthma.    Gastrointestinal: Positive for nausea and vomiting.   Musculoskeletal: Negative for muscle ache.   Skin: Negative for rash.   Allergic/Immunologic: Negative for seasonal allergies and asthma.   Hematologic/Lymphatic: Negative for swollen lymph nodes.       Objective:      Physical Exam   Constitutional: He is oriented to person, place, and time. He appears well-developed and well-nourished.   Occas coarse non prod cough   HENT:   Head: Normocephalic and atraumatic.   Right Ear:  Tympanic membrane, external ear and ear canal normal.   Left Ear: Tympanic membrane, external ear and ear canal normal.   Nose: Mucosal edema and rhinorrhea present. Right sinus exhibits maxillary sinus tenderness. Right sinus exhibits no frontal sinus tenderness. Left sinus exhibits maxillary sinus tenderness. Left sinus exhibits no frontal sinus tenderness.   Mouth/Throat: Uvula is midline, oropharynx is clear and moist and mucous membranes are normal.   Neck: Normal range of motion. Neck supple.   Cardiovascular: Regular rhythm and normal heart sounds.   Pulmonary/Chest:   Coarse BS bilat posteriorly   Musculoskeletal: Normal range of motion.   Lymphadenopathy:     He has no cervical adenopathy.   Neurological: He is alert and oriented to person, place, and time.   Skin: Skin is warm and dry.   Psychiatric: He has a normal mood and affect. His behavior is normal.         Assessment:       1. Acute sinusitis, recurrence not specified, unspecified location    2. Acute bronchitis, unspecified organism        Plan:         Acute sinusitis, recurrence not specified, unspecified location  -     POCT Influenza A/B    Acute bronchitis, unspecified organism         Danilo Jacobson MD  Go to the Emergency Department for any problems  Call your PCP for follow up next available.

## 2020-02-04 ENCOUNTER — TELEPHONE (OUTPATIENT)
Dept: URGENT CARE | Facility: CLINIC | Age: 40
End: 2020-02-04

## 2020-05-04 PROBLEM — J01.90 ACUTE SINUSITIS: Status: RESOLVED | Noted: 2020-02-01 | Resolved: 2020-05-04

## 2021-03-04 ENCOUNTER — HOSPITAL ENCOUNTER (EMERGENCY)
Facility: OTHER | Age: 41
Discharge: HOME | End: 2021-03-04
Attending: EMERGENCY MEDICINE
Payer: MEDICAID

## 2021-03-04 ENCOUNTER — OFFICE VISIT (OUTPATIENT)
Dept: URGENT CARE | Facility: CLINIC | Age: 41
End: 2021-03-04
Payer: MEDICAID

## 2021-03-04 VITALS
HEART RATE: 104 BPM | OXYGEN SATURATION: 94 % | WEIGHT: 195 LBS | TEMPERATURE: 98 F | HEIGHT: 72 IN | DIASTOLIC BLOOD PRESSURE: 110 MMHG | SYSTOLIC BLOOD PRESSURE: 170 MMHG | BODY MASS INDEX: 26.41 KG/M2

## 2021-03-04 VITALS
RESPIRATION RATE: 18 BRPM | TEMPERATURE: 98 F | OXYGEN SATURATION: 97 % | WEIGHT: 200 LBS | HEIGHT: 72 IN | BODY MASS INDEX: 27.09 KG/M2 | SYSTOLIC BLOOD PRESSURE: 150 MMHG | DIASTOLIC BLOOD PRESSURE: 98 MMHG | HEART RATE: 83 BPM

## 2021-03-04 DIAGNOSIS — F10.239 ALCOHOL DEPENDENCE WITH WITHDRAWAL WITH COMPLICATION: Primary | ICD-10-CM

## 2021-03-04 DIAGNOSIS — F10.930 ALCOHOL WITHDRAWAL SYNDROME WITHOUT COMPLICATION: Primary | ICD-10-CM

## 2021-03-04 DIAGNOSIS — I10 HYPERTENSION, UNSPECIFIED TYPE: ICD-10-CM

## 2021-03-04 PROBLEM — M10.9 GOUT, UNSPECIFIED: Status: ACTIVE | Noted: 2021-03-04

## 2021-03-04 LAB
HCV AB SERPL QL IA: NEGATIVE
HIV 1+2 AB+HIV1 P24 AG SERPL QL IA: NEGATIVE

## 2021-03-04 PROCEDURE — 99284 EMERGENCY DEPT VISIT MOD MDM: CPT

## 2021-03-04 PROCEDURE — 86703 HIV-1/HIV-2 1 RESULT ANTBDY: CPT | Performed by: EMERGENCY MEDICINE

## 2021-03-04 PROCEDURE — 86803 HEPATITIS C AB TEST: CPT | Performed by: EMERGENCY MEDICINE

## 2021-03-04 PROCEDURE — 25000003 PHARM REV CODE 250: Performed by: EMERGENCY MEDICINE

## 2021-03-04 PROCEDURE — 99499 UNLISTED E&M SERVICE: CPT | Mod: S$GLB,,, | Performed by: FAMILY MEDICINE

## 2021-03-04 PROCEDURE — 99499 NO LOS: ICD-10-PCS | Mod: S$GLB,,, | Performed by: FAMILY MEDICINE

## 2021-03-04 RX ORDER — CHLORDIAZEPOXIDE HYDROCHLORIDE 25 MG/1
50 CAPSULE, GELATIN COATED ORAL
Status: COMPLETED | OUTPATIENT
Start: 2021-03-04 | End: 2021-03-04

## 2021-03-04 RX ORDER — ONDANSETRON 4 MG/1
4 TABLET, ORALLY DISINTEGRATING ORAL EVERY 6 HOURS PRN
Qty: 12 TABLET | Refills: 0 | Status: SHIPPED | OUTPATIENT
Start: 2021-03-04 | End: 2021-06-09

## 2021-03-04 RX ORDER — CHLORDIAZEPOXIDE HYDROCHLORIDE 25 MG/1
CAPSULE, GELATIN COATED ORAL
Qty: 21 CAPSULE | Refills: 0 | Status: SHIPPED | OUTPATIENT
Start: 2021-03-04 | End: 2021-06-09

## 2021-03-04 RX ORDER — ONDANSETRON 8 MG/1
8 TABLET, ORALLY DISINTEGRATING ORAL
Status: COMPLETED | OUTPATIENT
Start: 2021-03-04 | End: 2021-03-04

## 2021-03-04 RX ADMIN — CHLORDIAZEPOXIDE HYDROCHLORIDE 50 MG: 25 CAPSULE ORAL at 09:03

## 2021-03-04 RX ADMIN — ONDANSETRON 8 MG: 8 TABLET, ORALLY DISINTEGRATING ORAL at 09:03

## 2021-03-05 ENCOUNTER — TELEPHONE (OUTPATIENT)
Dept: URGENT CARE | Facility: CLINIC | Age: 41
End: 2021-03-05

## 2021-06-09 ENCOUNTER — OFFICE VISIT (OUTPATIENT)
Dept: URGENT CARE | Facility: CLINIC | Age: 41
End: 2021-06-09
Payer: MEDICAID

## 2021-06-09 VITALS
BODY MASS INDEX: 25.73 KG/M2 | OXYGEN SATURATION: 98 % | TEMPERATURE: 98 F | SYSTOLIC BLOOD PRESSURE: 139 MMHG | HEIGHT: 72 IN | HEART RATE: 89 BPM | DIASTOLIC BLOOD PRESSURE: 85 MMHG | RESPIRATION RATE: 16 BRPM | WEIGHT: 190 LBS

## 2021-06-09 DIAGNOSIS — L98.9 SKIN LESIONS: ICD-10-CM

## 2021-06-09 DIAGNOSIS — F17.200 SMOKER: ICD-10-CM

## 2021-06-09 DIAGNOSIS — M76.61 ACHILLES TENDINITIS OF RIGHT LOWER EXTREMITY: ICD-10-CM

## 2021-06-09 DIAGNOSIS — M10.9 ACUTE GOUT OF MULTIPLE SITES, UNSPECIFIED CAUSE: Primary | ICD-10-CM

## 2021-06-09 DIAGNOSIS — I10 ESSENTIAL (PRIMARY) HYPERTENSION: ICD-10-CM

## 2021-06-09 PROBLEM — F10.21 ALCOHOLISM IN RECOVERY: Status: ACTIVE | Noted: 2021-03-04

## 2021-06-09 PROBLEM — F10.99 ALCOHOL USE, UNSPECIFIED WITH UNSPECIFIED ALCOHOL-INDUCED DISORDER: Status: RESOLVED | Noted: 2021-03-04 | Resolved: 2021-06-09

## 2021-06-09 PROBLEM — F10.239 ALCOHOL DEPENDENCE WITH WITHDRAWAL, UNSPECIFIED: Status: RESOLVED | Noted: 2021-03-04 | Resolved: 2021-06-09

## 2021-06-09 PROBLEM — F10.99 ALCOHOL USE, UNSPECIFIED WITH UNSPECIFIED ALCOHOL-INDUCED DISORDER: Status: ACTIVE | Noted: 2021-03-04

## 2021-06-09 PROBLEM — J20.9 ACUTE BRONCHITIS: Status: RESOLVED | Noted: 2020-02-01 | Resolved: 2021-06-09

## 2021-06-09 PROCEDURE — 99213 PR OFFICE/OUTPT VISIT, EST, LEVL III, 20-29 MIN: ICD-10-PCS | Mod: S$GLB,,, | Performed by: FAMILY MEDICINE

## 2021-06-09 PROCEDURE — 99213 OFFICE O/P EST LOW 20 MIN: CPT | Mod: S$GLB,,, | Performed by: FAMILY MEDICINE

## 2021-06-09 RX ORDER — PREDNISONE 20 MG/1
TABLET ORAL
Qty: 16 TABLET | Refills: 0 | Status: SHIPPED | OUTPATIENT
Start: 2021-06-09 | End: 2021-12-14 | Stop reason: ALTCHOICE

## 2021-06-09 RX ORDER — METOPROLOL SUCCINATE 50 MG/1
50 TABLET, EXTENDED RELEASE ORAL DAILY
Qty: 30 TABLET | Refills: 3 | Status: SHIPPED | OUTPATIENT
Start: 2021-06-09

## 2021-06-09 RX ORDER — AMLODIPINE BESYLATE 5 MG/1
5 TABLET ORAL DAILY
Qty: 30 TABLET | Refills: 3 | Status: SHIPPED | OUTPATIENT
Start: 2021-06-09 | End: 2022-06-09

## 2021-06-10 ENCOUNTER — TELEPHONE (OUTPATIENT)
Dept: URGENT CARE | Facility: CLINIC | Age: 41
End: 2021-06-10

## 2021-12-14 ENCOUNTER — OFFICE VISIT (OUTPATIENT)
Dept: URGENT CARE | Facility: CLINIC | Age: 41
End: 2021-12-14

## 2021-12-14 VITALS
OXYGEN SATURATION: 98 % | SYSTOLIC BLOOD PRESSURE: 157 MMHG | TEMPERATURE: 98 F | HEART RATE: 91 BPM | DIASTOLIC BLOOD PRESSURE: 91 MMHG | RESPIRATION RATE: 16 BRPM

## 2021-12-14 DIAGNOSIS — I10 PRIMARY HYPERTENSION: ICD-10-CM

## 2021-12-14 DIAGNOSIS — M10.021 ACUTE IDIOPATHIC GOUT OF RIGHT ELBOW: Primary | ICD-10-CM

## 2021-12-14 PROCEDURE — 99499 UNLISTED E&M SERVICE: CPT | Mod: S$GLB,,, | Performed by: STUDENT IN AN ORGANIZED HEALTH CARE EDUCATION/TRAINING PROGRAM

## 2021-12-14 PROCEDURE — 99214 PR OFFICE/OUTPT VISIT, EST, LEVL IV, 30-39 MIN: ICD-10-PCS | Mod: S$GLB,,, | Performed by: STUDENT IN AN ORGANIZED HEALTH CARE EDUCATION/TRAINING PROGRAM

## 2021-12-14 PROCEDURE — 99214 OFFICE O/P EST MOD 30 MIN: CPT | Mod: S$GLB,,, | Performed by: STUDENT IN AN ORGANIZED HEALTH CARE EDUCATION/TRAINING PROGRAM

## 2021-12-14 PROCEDURE — 99499 NO LOS: ICD-10-PCS | Mod: S$GLB,,, | Performed by: STUDENT IN AN ORGANIZED HEALTH CARE EDUCATION/TRAINING PROGRAM

## 2021-12-14 RX ORDER — PREDNISONE 10 MG/1
TABLET ORAL
Qty: 19 TABLET | Refills: 0 | Status: SHIPPED | OUTPATIENT
Start: 2021-12-14 | End: 2021-12-21